# Patient Record
Sex: MALE | Race: WHITE | NOT HISPANIC OR LATINO | URBAN - METROPOLITAN AREA
[De-identification: names, ages, dates, MRNs, and addresses within clinical notes are randomized per-mention and may not be internally consistent; named-entity substitution may affect disease eponyms.]

---

## 2017-03-13 ENCOUNTER — AMBULATORY - HEALTHEAST (OUTPATIENT)
Dept: GERIATRICS | Facility: CLINIC | Age: 82
End: 2017-03-13

## 2017-03-13 ENCOUNTER — AMBULATORY - HEALTHEAST (OUTPATIENT)
Dept: ADMINISTRATIVE | Facility: CLINIC | Age: 82
End: 2017-03-13

## 2017-03-13 ASSESSMENT — MIFFLIN-ST. JEOR: SCORE: 2165.85

## 2017-03-14 ENCOUNTER — OFFICE VISIT - HEALTHEAST (OUTPATIENT)
Dept: GERIATRICS | Facility: CLINIC | Age: 82
End: 2017-03-14

## 2017-03-14 DIAGNOSIS — G89.29 CHRONIC BILATERAL LOW BACK PAIN WITHOUT SCIATICA: ICD-10-CM

## 2017-03-14 DIAGNOSIS — I82.409 RECURRENT DEEP VEIN THROMBOSIS (DVT) (H): ICD-10-CM

## 2017-03-14 DIAGNOSIS — I50.32 CHRONIC DIASTOLIC CONGESTIVE HEART FAILURE (H): ICD-10-CM

## 2017-03-14 DIAGNOSIS — J44.9 CHRONIC OBSTRUCTIVE PULMONARY DISEASE, UNSPECIFIED COPD TYPE (H): ICD-10-CM

## 2017-03-14 DIAGNOSIS — N18.30 CHRONIC KIDNEY DISEASE, STAGE III (MODERATE) (H): ICD-10-CM

## 2017-03-14 DIAGNOSIS — G47.33 OSA ON CPAP: ICD-10-CM

## 2017-03-14 DIAGNOSIS — E66.09 OBESITY DUE TO EXCESS CALORIES, UNSPECIFIED OBESITY SEVERITY: ICD-10-CM

## 2017-03-14 DIAGNOSIS — K51.919 ULCERATIVE COLITIS WITH COMPLICATION, UNSPECIFIED LOCATION (H): ICD-10-CM

## 2017-03-14 DIAGNOSIS — Z79.01 ANTICOAGULATED ON COUMADIN: ICD-10-CM

## 2017-03-14 DIAGNOSIS — Z95.0 CARDIAC PACEMAKER IN SITU: ICD-10-CM

## 2017-03-14 DIAGNOSIS — I25.10 CORONARY ARTERY DISEASE INVOLVING NATIVE CORONARY ARTERY OF NATIVE HEART WITHOUT ANGINA PECTORIS: ICD-10-CM

## 2017-03-14 DIAGNOSIS — I10 ESSENTIAL HYPERTENSION WITH GOAL BLOOD PRESSURE LESS THAN 140/90: ICD-10-CM

## 2017-03-14 DIAGNOSIS — I71.40 ABDOMINAL AORTIC ANEURYSM (AAA) WITHOUT RUPTURE (H): ICD-10-CM

## 2017-03-14 DIAGNOSIS — I48.20 CHRONIC ATRIAL FIBRILLATION (H): ICD-10-CM

## 2017-03-14 DIAGNOSIS — M54.50 CHRONIC BILATERAL LOW BACK PAIN WITHOUT SCIATICA: ICD-10-CM

## 2017-03-14 DIAGNOSIS — S32.049D CLOSED FRACTURE OF FOURTH LUMBAR VERTEBRA WITH ROUTINE HEALING, UNSPECIFIED FRACTURE MORPHOLOGY, SUBSEQUENT ENCOUNTER: ICD-10-CM

## 2017-03-14 DIAGNOSIS — L71.9 ROSACEA: ICD-10-CM

## 2017-03-16 ENCOUNTER — OFFICE VISIT - HEALTHEAST (OUTPATIENT)
Dept: GERIATRICS | Facility: CLINIC | Age: 82
End: 2017-03-16

## 2017-03-16 DIAGNOSIS — G47.33 OSA ON CPAP: ICD-10-CM

## 2017-03-16 DIAGNOSIS — I82.409 RECURRENT DEEP VEIN THROMBOSIS (DVT) (H): ICD-10-CM

## 2017-03-16 DIAGNOSIS — M54.50 CHRONIC BILATERAL LOW BACK PAIN WITHOUT SCIATICA: ICD-10-CM

## 2017-03-16 DIAGNOSIS — S32.049D CLOSED FRACTURE OF FOURTH LUMBAR VERTEBRA WITH ROUTINE HEALING, UNSPECIFIED FRACTURE MORPHOLOGY, SUBSEQUENT ENCOUNTER: ICD-10-CM

## 2017-03-16 DIAGNOSIS — K51.919 ULCERATIVE COLITIS WITH COMPLICATION, UNSPECIFIED LOCATION (H): ICD-10-CM

## 2017-03-16 DIAGNOSIS — G89.29 CHRONIC BILATERAL LOW BACK PAIN WITHOUT SCIATICA: ICD-10-CM

## 2017-03-16 DIAGNOSIS — I10 ESSENTIAL HYPERTENSION WITH GOAL BLOOD PRESSURE LESS THAN 140/90: ICD-10-CM

## 2017-03-16 DIAGNOSIS — Z79.01 ANTICOAGULATED ON COUMADIN: ICD-10-CM

## 2017-03-16 DIAGNOSIS — N18.30 CHRONIC KIDNEY DISEASE, STAGE III (MODERATE) (H): ICD-10-CM

## 2017-03-16 DIAGNOSIS — I50.32 CHRONIC DIASTOLIC CONGESTIVE HEART FAILURE (H): ICD-10-CM

## 2017-03-16 DIAGNOSIS — I25.10 CORONARY ARTERY DISEASE INVOLVING NATIVE CORONARY ARTERY OF NATIVE HEART WITHOUT ANGINA PECTORIS: ICD-10-CM

## 2017-03-16 DIAGNOSIS — Z95.0 CARDIAC PACEMAKER IN SITU: ICD-10-CM

## 2017-03-16 DIAGNOSIS — I48.20 CHRONIC ATRIAL FIBRILLATION (H): ICD-10-CM

## 2017-03-16 DIAGNOSIS — L71.9 ROSACEA: ICD-10-CM

## 2017-03-16 DIAGNOSIS — E66.09 OBESITY DUE TO EXCESS CALORIES, UNSPECIFIED OBESITY SEVERITY: ICD-10-CM

## 2017-03-16 DIAGNOSIS — I71.40 ABDOMINAL AORTIC ANEURYSM (AAA) WITHOUT RUPTURE (H): ICD-10-CM

## 2017-03-16 DIAGNOSIS — J44.9 CHRONIC OBSTRUCTIVE PULMONARY DISEASE, UNSPECIFIED COPD TYPE (H): ICD-10-CM

## 2017-03-16 ASSESSMENT — MIFFLIN-ST. JEOR: SCORE: 2165.85

## 2017-03-21 ENCOUNTER — OFFICE VISIT - HEALTHEAST (OUTPATIENT)
Dept: GERIATRICS | Facility: CLINIC | Age: 82
End: 2017-03-21

## 2017-03-21 DIAGNOSIS — N18.30 CHRONIC KIDNEY DISEASE, STAGE III (MODERATE) (H): ICD-10-CM

## 2017-03-21 DIAGNOSIS — Z95.0 CARDIAC PACEMAKER IN SITU: ICD-10-CM

## 2017-03-21 DIAGNOSIS — I10 ESSENTIAL HYPERTENSION WITH GOAL BLOOD PRESSURE LESS THAN 140/90: ICD-10-CM

## 2017-03-21 DIAGNOSIS — Z79.01 ANTICOAGULATED ON COUMADIN: ICD-10-CM

## 2017-03-21 DIAGNOSIS — J44.9 CHRONIC OBSTRUCTIVE PULMONARY DISEASE, UNSPECIFIED COPD TYPE (H): ICD-10-CM

## 2017-03-21 DIAGNOSIS — I48.20 CHRONIC ATRIAL FIBRILLATION (H): ICD-10-CM

## 2017-03-21 DIAGNOSIS — S32.049D CLOSED FRACTURE OF FOURTH LUMBAR VERTEBRA WITH ROUTINE HEALING, UNSPECIFIED FRACTURE MORPHOLOGY, SUBSEQUENT ENCOUNTER: ICD-10-CM

## 2017-03-23 ENCOUNTER — OFFICE VISIT - HEALTHEAST (OUTPATIENT)
Dept: GERIATRICS | Facility: CLINIC | Age: 82
End: 2017-03-23

## 2017-03-23 DIAGNOSIS — I48.20 CHRONIC ATRIAL FIBRILLATION (H): ICD-10-CM

## 2017-03-23 DIAGNOSIS — K51.919 ULCERATIVE COLITIS WITH COMPLICATION, UNSPECIFIED LOCATION (H): ICD-10-CM

## 2017-03-23 DIAGNOSIS — G47.33 OSA ON CPAP: ICD-10-CM

## 2017-03-23 DIAGNOSIS — I82.409 RECURRENT DEEP VEIN THROMBOSIS (DVT) (H): ICD-10-CM

## 2017-03-23 DIAGNOSIS — I10 ESSENTIAL HYPERTENSION WITH GOAL BLOOD PRESSURE LESS THAN 140/90: ICD-10-CM

## 2017-03-23 DIAGNOSIS — S32.049D CLOSED FRACTURE OF FOURTH LUMBAR VERTEBRA WITH ROUTINE HEALING, UNSPECIFIED FRACTURE MORPHOLOGY, SUBSEQUENT ENCOUNTER: ICD-10-CM

## 2017-03-23 DIAGNOSIS — J44.9 CHRONIC OBSTRUCTIVE PULMONARY DISEASE, UNSPECIFIED COPD TYPE (H): ICD-10-CM

## 2017-03-23 DIAGNOSIS — E66.09 OBESITY DUE TO EXCESS CALORIES, UNSPECIFIED OBESITY SEVERITY: ICD-10-CM

## 2017-03-23 DIAGNOSIS — N40.0 BENIGN PROSTATIC HYPERPLASIA, PRESENCE OF LOWER URINARY TRACT SYMPTOMS UNSPECIFIED, UNSPECIFIED MORPHOLOGY: ICD-10-CM

## 2017-03-23 DIAGNOSIS — N18.30 CHRONIC KIDNEY DISEASE, STAGE III (MODERATE) (H): ICD-10-CM

## 2017-03-23 DIAGNOSIS — G89.29 CHRONIC BILATERAL LOW BACK PAIN WITHOUT SCIATICA: ICD-10-CM

## 2017-03-23 DIAGNOSIS — L71.9 ROSACEA: ICD-10-CM

## 2017-03-23 DIAGNOSIS — Z95.0 CARDIAC PACEMAKER IN SITU: ICD-10-CM

## 2017-03-23 DIAGNOSIS — Z79.01 ANTICOAGULATED ON COUMADIN: ICD-10-CM

## 2017-03-23 DIAGNOSIS — M54.50 CHRONIC BILATERAL LOW BACK PAIN WITHOUT SCIATICA: ICD-10-CM

## 2017-03-23 DIAGNOSIS — I71.40 ABDOMINAL AORTIC ANEURYSM (AAA) WITHOUT RUPTURE (H): ICD-10-CM

## 2017-03-23 DIAGNOSIS — I50.32 CHRONIC DIASTOLIC CONGESTIVE HEART FAILURE (H): ICD-10-CM

## 2017-03-23 DIAGNOSIS — I25.10 CORONARY ARTERY DISEASE INVOLVING NATIVE CORONARY ARTERY OF NATIVE HEART WITHOUT ANGINA PECTORIS: ICD-10-CM

## 2017-03-28 ENCOUNTER — OFFICE VISIT - HEALTHEAST (OUTPATIENT)
Dept: GERIATRICS | Facility: CLINIC | Age: 82
End: 2017-03-28

## 2017-03-28 DIAGNOSIS — Z95.0 CARDIAC PACEMAKER IN SITU: ICD-10-CM

## 2017-03-28 DIAGNOSIS — S32.049D CLOSED FRACTURE OF FOURTH LUMBAR VERTEBRA WITH ROUTINE HEALING, UNSPECIFIED FRACTURE MORPHOLOGY, SUBSEQUENT ENCOUNTER: ICD-10-CM

## 2017-03-28 DIAGNOSIS — M54.50 CHRONIC BILATERAL LOW BACK PAIN WITHOUT SCIATICA: ICD-10-CM

## 2017-03-28 DIAGNOSIS — I48.20 CHRONIC ATRIAL FIBRILLATION (H): ICD-10-CM

## 2017-03-28 DIAGNOSIS — I82.409 RECURRENT DEEP VEIN THROMBOSIS (DVT) (H): ICD-10-CM

## 2017-03-28 DIAGNOSIS — J44.9 COPD (CHRONIC OBSTRUCTIVE PULMONARY DISEASE) (H): ICD-10-CM

## 2017-03-28 DIAGNOSIS — I71.40 ABDOMINAL AORTIC ANEURYSM (AAA) WITHOUT RUPTURE (H): ICD-10-CM

## 2017-03-28 DIAGNOSIS — N18.30 CHRONIC KIDNEY DISEASE, STAGE III (MODERATE) (H): ICD-10-CM

## 2017-03-28 DIAGNOSIS — I10 ESSENTIAL HYPERTENSION WITH GOAL BLOOD PRESSURE LESS THAN 140/90: ICD-10-CM

## 2017-03-28 DIAGNOSIS — G47.33 OSA ON CPAP: ICD-10-CM

## 2017-03-28 DIAGNOSIS — G89.29 CHRONIC BILATERAL LOW BACK PAIN WITHOUT SCIATICA: ICD-10-CM

## 2017-03-28 DIAGNOSIS — I50.32 CHRONIC DIASTOLIC CONGESTIVE HEART FAILURE (H): ICD-10-CM

## 2017-03-28 DIAGNOSIS — K51.919 ULCERATIVE COLITIS WITH COMPLICATION, UNSPECIFIED LOCATION (H): ICD-10-CM

## 2017-03-28 DIAGNOSIS — I25.10 CORONARY ARTERY DISEASE INVOLVING NATIVE CORONARY ARTERY OF NATIVE HEART WITHOUT ANGINA PECTORIS: ICD-10-CM

## 2017-03-28 DIAGNOSIS — E66.09 OBESITY DUE TO EXCESS CALORIES, UNSPECIFIED OBESITY SEVERITY: ICD-10-CM

## 2017-03-28 DIAGNOSIS — N40.0 BENIGN PROSTATIC HYPERPLASIA, PRESENCE OF LOWER URINARY TRACT SYMPTOMS UNSPECIFIED, UNSPECIFIED MORPHOLOGY: ICD-10-CM

## 2017-03-29 ENCOUNTER — AMBULATORY - HEALTHEAST (OUTPATIENT)
Dept: GERIATRICS | Facility: CLINIC | Age: 82
End: 2017-03-29

## 2017-03-30 ENCOUNTER — OFFICE VISIT - HEALTHEAST (OUTPATIENT)
Dept: GERIATRICS | Facility: CLINIC | Age: 82
End: 2017-03-30

## 2017-03-30 DIAGNOSIS — J44.9 CHRONIC OBSTRUCTIVE PULMONARY DISEASE, UNSPECIFIED COPD TYPE (H): ICD-10-CM

## 2017-03-30 DIAGNOSIS — I48.20 CHRONIC ATRIAL FIBRILLATION (H): ICD-10-CM

## 2017-03-30 DIAGNOSIS — I82.409 RECURRENT DEEP VEIN THROMBOSIS (DVT) (H): ICD-10-CM

## 2017-03-30 DIAGNOSIS — K51.919 ULCERATIVE COLITIS WITH COMPLICATION, UNSPECIFIED LOCATION (H): ICD-10-CM

## 2017-03-30 DIAGNOSIS — E66.09 OBESITY DUE TO EXCESS CALORIES, UNSPECIFIED OBESITY SEVERITY: ICD-10-CM

## 2017-03-30 DIAGNOSIS — M54.50 CHRONIC BILATERAL LOW BACK PAIN WITHOUT SCIATICA: ICD-10-CM

## 2017-03-30 DIAGNOSIS — Z95.0 CARDIAC PACEMAKER IN SITU: ICD-10-CM

## 2017-03-30 DIAGNOSIS — I25.10 CORONARY ARTERY DISEASE INVOLVING NATIVE CORONARY ARTERY OF NATIVE HEART WITHOUT ANGINA PECTORIS: ICD-10-CM

## 2017-03-30 DIAGNOSIS — I71.40 ABDOMINAL AORTIC ANEURYSM (AAA) WITHOUT RUPTURE (H): ICD-10-CM

## 2017-03-30 DIAGNOSIS — N40.0 BENIGN PROSTATIC HYPERPLASIA, PRESENCE OF LOWER URINARY TRACT SYMPTOMS UNSPECIFIED, UNSPECIFIED MORPHOLOGY: ICD-10-CM

## 2017-03-30 DIAGNOSIS — I50.32 CHRONIC DIASTOLIC CONGESTIVE HEART FAILURE (H): ICD-10-CM

## 2017-03-30 DIAGNOSIS — S32.049D CLOSED FRACTURE OF FOURTH LUMBAR VERTEBRA WITH ROUTINE HEALING, UNSPECIFIED FRACTURE MORPHOLOGY, SUBSEQUENT ENCOUNTER: ICD-10-CM

## 2017-03-30 DIAGNOSIS — I10 ESSENTIAL HYPERTENSION WITH GOAL BLOOD PRESSURE LESS THAN 140/90: ICD-10-CM

## 2017-03-30 DIAGNOSIS — N18.30 CHRONIC KIDNEY DISEASE, STAGE III (MODERATE) (H): ICD-10-CM

## 2017-03-30 DIAGNOSIS — G89.29 CHRONIC BILATERAL LOW BACK PAIN WITHOUT SCIATICA: ICD-10-CM

## 2017-03-30 DIAGNOSIS — G47.33 OSA ON CPAP: ICD-10-CM

## 2017-03-30 DIAGNOSIS — L71.9 ROSACEA: ICD-10-CM

## 2017-03-30 DIAGNOSIS — Z79.01 ANTICOAGULATED ON COUMADIN: ICD-10-CM

## 2017-04-04 ENCOUNTER — OFFICE VISIT - HEALTHEAST (OUTPATIENT)
Dept: GERIATRICS | Facility: CLINIC | Age: 82
End: 2017-04-04

## 2017-04-04 DIAGNOSIS — I48.20 CHRONIC ATRIAL FIBRILLATION (H): ICD-10-CM

## 2017-04-04 DIAGNOSIS — I71.40 ABDOMINAL AORTIC ANEURYSM (AAA) WITHOUT RUPTURE (H): ICD-10-CM

## 2017-04-04 DIAGNOSIS — Z79.01 ANTICOAGULATED ON COUMADIN: ICD-10-CM

## 2017-04-04 DIAGNOSIS — N18.30 CHRONIC KIDNEY DISEASE, STAGE III (MODERATE) (H): ICD-10-CM

## 2017-04-04 DIAGNOSIS — I82.409 RECURRENT DEEP VEIN THROMBOSIS (DVT) (H): ICD-10-CM

## 2017-04-04 DIAGNOSIS — L97.509 FOOT ULCER (H): ICD-10-CM

## 2017-04-04 DIAGNOSIS — Z95.0 CARDIAC PACEMAKER IN SITU: ICD-10-CM

## 2017-04-04 DIAGNOSIS — J44.9 CHRONIC OBSTRUCTIVE PULMONARY DISEASE, UNSPECIFIED COPD TYPE (H): ICD-10-CM

## 2017-04-04 DIAGNOSIS — I10 ESSENTIAL HYPERTENSION WITH GOAL BLOOD PRESSURE LESS THAN 140/90: ICD-10-CM

## 2017-04-04 DIAGNOSIS — S32.049D CLOSED FRACTURE OF FOURTH LUMBAR VERTEBRA WITH ROUTINE HEALING, UNSPECIFIED FRACTURE MORPHOLOGY, SUBSEQUENT ENCOUNTER: ICD-10-CM

## 2017-04-04 DIAGNOSIS — G47.33 OSA ON CPAP: ICD-10-CM

## 2017-04-06 ENCOUNTER — OFFICE VISIT - HEALTHEAST (OUTPATIENT)
Dept: GERIATRICS | Facility: CLINIC | Age: 82
End: 2017-04-06

## 2017-04-06 DIAGNOSIS — Z79.01 ANTICOAGULATED ON COUMADIN: ICD-10-CM

## 2017-04-06 DIAGNOSIS — G89.29 CHRONIC BILATERAL LOW BACK PAIN WITHOUT SCIATICA: ICD-10-CM

## 2017-04-06 DIAGNOSIS — I10 ESSENTIAL HYPERTENSION WITH GOAL BLOOD PRESSURE LESS THAN 140/90: ICD-10-CM

## 2017-04-06 DIAGNOSIS — N40.0 BENIGN PROSTATIC HYPERPLASIA, PRESENCE OF LOWER URINARY TRACT SYMPTOMS UNSPECIFIED, UNSPECIFIED MORPHOLOGY: ICD-10-CM

## 2017-04-06 DIAGNOSIS — L71.9 ROSACEA: ICD-10-CM

## 2017-04-06 DIAGNOSIS — Z95.0 CARDIAC PACEMAKER IN SITU: ICD-10-CM

## 2017-04-06 DIAGNOSIS — G47.33 OSA ON CPAP: ICD-10-CM

## 2017-04-06 DIAGNOSIS — I25.10 CORONARY ARTERY DISEASE INVOLVING NATIVE CORONARY ARTERY OF NATIVE HEART WITHOUT ANGINA PECTORIS: ICD-10-CM

## 2017-04-06 DIAGNOSIS — K51.919 ULCERATIVE COLITIS WITH COMPLICATION, UNSPECIFIED LOCATION (H): ICD-10-CM

## 2017-04-06 DIAGNOSIS — I71.40 ABDOMINAL AORTIC ANEURYSM (AAA) WITHOUT RUPTURE (H): ICD-10-CM

## 2017-04-06 DIAGNOSIS — E66.09 OBESITY DUE TO EXCESS CALORIES, UNSPECIFIED OBESITY SEVERITY: ICD-10-CM

## 2017-04-06 DIAGNOSIS — I50.32 CHRONIC DIASTOLIC CONGESTIVE HEART FAILURE (H): ICD-10-CM

## 2017-04-06 DIAGNOSIS — J44.9 CHRONIC OBSTRUCTIVE PULMONARY DISEASE, UNSPECIFIED COPD TYPE (H): ICD-10-CM

## 2017-04-06 DIAGNOSIS — S32.049D CLOSED FRACTURE OF FOURTH LUMBAR VERTEBRA WITH ROUTINE HEALING, UNSPECIFIED FRACTURE MORPHOLOGY, SUBSEQUENT ENCOUNTER: ICD-10-CM

## 2017-04-06 DIAGNOSIS — S90.812D: ICD-10-CM

## 2017-04-06 DIAGNOSIS — I48.20 CHRONIC ATRIAL FIBRILLATION (H): ICD-10-CM

## 2017-04-06 DIAGNOSIS — N18.30 CHRONIC KIDNEY DISEASE, STAGE III (MODERATE) (H): ICD-10-CM

## 2017-04-06 DIAGNOSIS — I82.409 RECURRENT DEEP VEIN THROMBOSIS (DVT) (H): ICD-10-CM

## 2017-04-06 DIAGNOSIS — M54.50 CHRONIC BILATERAL LOW BACK PAIN WITHOUT SCIATICA: ICD-10-CM

## 2017-04-11 ENCOUNTER — OFFICE VISIT - HEALTHEAST (OUTPATIENT)
Dept: GERIATRICS | Facility: CLINIC | Age: 82
End: 2017-04-11

## 2017-04-11 DIAGNOSIS — M54.50 CHRONIC BILATERAL LOW BACK PAIN WITHOUT SCIATICA: ICD-10-CM

## 2017-04-11 DIAGNOSIS — L97.509 FOOT ULCER (H): ICD-10-CM

## 2017-04-11 DIAGNOSIS — I50.32 CHRONIC DIASTOLIC CONGESTIVE HEART FAILURE (H): ICD-10-CM

## 2017-04-11 DIAGNOSIS — K51.919 ULCERATIVE COLITIS WITH COMPLICATION, UNSPECIFIED LOCATION (H): ICD-10-CM

## 2017-04-11 DIAGNOSIS — S32.049D CLOSED FRACTURE OF FOURTH LUMBAR VERTEBRA WITH ROUTINE HEALING, UNSPECIFIED FRACTURE MORPHOLOGY, SUBSEQUENT ENCOUNTER: ICD-10-CM

## 2017-04-11 DIAGNOSIS — Z95.0 CARDIAC PACEMAKER IN SITU: ICD-10-CM

## 2017-04-11 DIAGNOSIS — I71.40 ABDOMINAL AORTIC ANEURYSM (AAA) WITHOUT RUPTURE (H): ICD-10-CM

## 2017-04-11 DIAGNOSIS — N40.0 BENIGN PROSTATIC HYPERPLASIA, PRESENCE OF LOWER URINARY TRACT SYMPTOMS UNSPECIFIED, UNSPECIFIED MORPHOLOGY: ICD-10-CM

## 2017-04-11 DIAGNOSIS — I82.409 RECURRENT DEEP VEIN THROMBOSIS (DVT) (H): ICD-10-CM

## 2017-04-11 DIAGNOSIS — I25.10 CORONARY ARTERY DISEASE INVOLVING NATIVE CORONARY ARTERY OF NATIVE HEART WITHOUT ANGINA PECTORIS: ICD-10-CM

## 2017-04-11 DIAGNOSIS — G47.33 OSA ON CPAP: ICD-10-CM

## 2017-04-11 DIAGNOSIS — J44.9 COPD (CHRONIC OBSTRUCTIVE PULMONARY DISEASE) (H): ICD-10-CM

## 2017-04-11 DIAGNOSIS — I80.9 THROMBOPHLEBITIS: ICD-10-CM

## 2017-04-11 DIAGNOSIS — I10 ESSENTIAL HYPERTENSION WITH GOAL BLOOD PRESSURE LESS THAN 140/90: ICD-10-CM

## 2017-04-11 DIAGNOSIS — Z79.01 ANTICOAGULATED ON COUMADIN: ICD-10-CM

## 2017-04-11 DIAGNOSIS — E66.09 OBESITY DUE TO EXCESS CALORIES, UNSPECIFIED OBESITY SEVERITY: ICD-10-CM

## 2017-04-11 DIAGNOSIS — G89.29 CHRONIC BILATERAL LOW BACK PAIN WITHOUT SCIATICA: ICD-10-CM

## 2017-04-11 DIAGNOSIS — I48.20 CHRONIC ATRIAL FIBRILLATION (H): ICD-10-CM

## 2017-04-11 DIAGNOSIS — N18.30 CHRONIC KIDNEY DISEASE, STAGE III (MODERATE) (H): ICD-10-CM

## 2017-04-14 ENCOUNTER — OFFICE VISIT - HEALTHEAST (OUTPATIENT)
Dept: GERIATRICS | Facility: CLINIC | Age: 82
End: 2017-04-14

## 2017-04-14 DIAGNOSIS — G47.33 OSA ON CPAP: ICD-10-CM

## 2017-04-14 DIAGNOSIS — G89.29 CHRONIC BILATERAL LOW BACK PAIN WITHOUT SCIATICA: ICD-10-CM

## 2017-04-14 DIAGNOSIS — Z79.01 ANTICOAGULATED ON COUMADIN: ICD-10-CM

## 2017-04-14 DIAGNOSIS — M54.50 CHRONIC BILATERAL LOW BACK PAIN WITHOUT SCIATICA: ICD-10-CM

## 2017-04-14 DIAGNOSIS — I50.32 CHRONIC DIASTOLIC CONGESTIVE HEART FAILURE (H): ICD-10-CM

## 2017-04-14 DIAGNOSIS — N18.30 CHRONIC KIDNEY DISEASE, STAGE III (MODERATE) (H): ICD-10-CM

## 2017-04-14 DIAGNOSIS — I71.40 ABDOMINAL AORTIC ANEURYSM (AAA) WITHOUT RUPTURE (H): ICD-10-CM

## 2017-04-14 DIAGNOSIS — J44.9 CHRONIC OBSTRUCTIVE PULMONARY DISEASE, UNSPECIFIED COPD TYPE (H): ICD-10-CM

## 2017-04-14 DIAGNOSIS — K51.919 ULCERATIVE COLITIS WITH COMPLICATION, UNSPECIFIED LOCATION (H): ICD-10-CM

## 2017-04-14 DIAGNOSIS — I82.409 RECURRENT DEEP VEIN THROMBOSIS (DVT) (H): ICD-10-CM

## 2017-04-14 DIAGNOSIS — E66.09 OBESITY DUE TO EXCESS CALORIES, UNSPECIFIED OBESITY SEVERITY: ICD-10-CM

## 2017-04-14 DIAGNOSIS — Z95.0 CARDIAC PACEMAKER IN SITU: ICD-10-CM

## 2017-04-14 DIAGNOSIS — S32.049D CLOSED FRACTURE OF FOURTH LUMBAR VERTEBRA WITH ROUTINE HEALING, UNSPECIFIED FRACTURE MORPHOLOGY, SUBSEQUENT ENCOUNTER: ICD-10-CM

## 2017-04-14 DIAGNOSIS — N40.0 BENIGN PROSTATIC HYPERPLASIA, PRESENCE OF LOWER URINARY TRACT SYMPTOMS UNSPECIFIED, UNSPECIFIED MORPHOLOGY: ICD-10-CM

## 2017-04-14 DIAGNOSIS — L71.9 ROSACEA: ICD-10-CM

## 2017-04-14 DIAGNOSIS — I10 ESSENTIAL HYPERTENSION WITH GOAL BLOOD PRESSURE LESS THAN 140/90: ICD-10-CM

## 2017-04-14 DIAGNOSIS — I25.10 CORONARY ARTERY DISEASE INVOLVING NATIVE CORONARY ARTERY OF NATIVE HEART WITHOUT ANGINA PECTORIS: ICD-10-CM

## 2017-04-14 DIAGNOSIS — I48.20 CHRONIC ATRIAL FIBRILLATION (H): ICD-10-CM

## 2017-04-18 ENCOUNTER — OFFICE VISIT - HEALTHEAST (OUTPATIENT)
Dept: GERIATRICS | Facility: CLINIC | Age: 82
End: 2017-04-18

## 2017-04-18 DIAGNOSIS — N18.30 CHRONIC KIDNEY DISEASE, STAGE III (MODERATE) (H): ICD-10-CM

## 2017-04-18 DIAGNOSIS — S90.812D: ICD-10-CM

## 2017-04-18 DIAGNOSIS — Z79.01 ANTICOAGULATED ON COUMADIN: ICD-10-CM

## 2017-04-18 DIAGNOSIS — K51.919 ULCERATIVE COLITIS WITH COMPLICATION, UNSPECIFIED LOCATION (H): ICD-10-CM

## 2017-04-18 DIAGNOSIS — J44.9 CHRONIC OBSTRUCTIVE PULMONARY DISEASE, UNSPECIFIED COPD TYPE (H): ICD-10-CM

## 2017-04-18 DIAGNOSIS — E66.09 OBESITY DUE TO EXCESS CALORIES, UNSPECIFIED OBESITY SEVERITY: ICD-10-CM

## 2017-04-18 DIAGNOSIS — L97.509 FOOT ULCER (H): ICD-10-CM

## 2017-04-18 DIAGNOSIS — I50.32 CHRONIC DIASTOLIC CONGESTIVE HEART FAILURE (H): ICD-10-CM

## 2017-04-18 DIAGNOSIS — I82.409 RECURRENT DEEP VEIN THROMBOSIS (DVT) (H): ICD-10-CM

## 2017-04-18 DIAGNOSIS — I48.20 CHRONIC ATRIAL FIBRILLATION (H): ICD-10-CM

## 2017-04-18 DIAGNOSIS — R19.7 DIARRHEA: ICD-10-CM

## 2017-04-18 DIAGNOSIS — S32.049D CLOSED FRACTURE OF FOURTH LUMBAR VERTEBRA WITH ROUTINE HEALING, UNSPECIFIED FRACTURE MORPHOLOGY, SUBSEQUENT ENCOUNTER: ICD-10-CM

## 2017-04-18 DIAGNOSIS — Z95.0 CARDIAC PACEMAKER IN SITU: ICD-10-CM

## 2017-04-20 ENCOUNTER — OFFICE VISIT - HEALTHEAST (OUTPATIENT)
Dept: GERIATRICS | Facility: CLINIC | Age: 82
End: 2017-04-20

## 2017-04-20 DIAGNOSIS — N40.0 BENIGN PROSTATIC HYPERPLASIA, PRESENCE OF LOWER URINARY TRACT SYMPTOMS UNSPECIFIED, UNSPECIFIED MORPHOLOGY: ICD-10-CM

## 2017-04-20 DIAGNOSIS — E66.09 OBESITY DUE TO EXCESS CALORIES, UNSPECIFIED OBESITY SEVERITY: ICD-10-CM

## 2017-04-20 DIAGNOSIS — I48.20 CHRONIC ATRIAL FIBRILLATION (H): ICD-10-CM

## 2017-04-20 DIAGNOSIS — I50.32 CHRONIC DIASTOLIC CONGESTIVE HEART FAILURE (H): ICD-10-CM

## 2017-04-20 DIAGNOSIS — I10 ESSENTIAL HYPERTENSION WITH GOAL BLOOD PRESSURE LESS THAN 140/90: ICD-10-CM

## 2017-04-20 DIAGNOSIS — Z79.01 ANTICOAGULATED ON COUMADIN: ICD-10-CM

## 2017-04-20 DIAGNOSIS — K51.919 ULCERATIVE COLITIS WITH COMPLICATION, UNSPECIFIED LOCATION (H): ICD-10-CM

## 2017-04-20 DIAGNOSIS — Z95.0 CARDIAC PACEMAKER IN SITU: ICD-10-CM

## 2017-04-20 DIAGNOSIS — I71.40 ABDOMINAL AORTIC ANEURYSM (AAA) WITHOUT RUPTURE (H): ICD-10-CM

## 2017-04-20 DIAGNOSIS — I82.409 RECURRENT DEEP VEIN THROMBOSIS (DVT) (H): ICD-10-CM

## 2017-04-20 DIAGNOSIS — S90.812D: ICD-10-CM

## 2017-04-20 DIAGNOSIS — S32.049D CLOSED FRACTURE OF FOURTH LUMBAR VERTEBRA WITH ROUTINE HEALING, UNSPECIFIED FRACTURE MORPHOLOGY, SUBSEQUENT ENCOUNTER: ICD-10-CM

## 2017-04-20 DIAGNOSIS — I25.10 CORONARY ARTERY DISEASE INVOLVING NATIVE CORONARY ARTERY OF NATIVE HEART WITHOUT ANGINA PECTORIS: ICD-10-CM

## 2017-04-20 DIAGNOSIS — N18.30 CHRONIC KIDNEY DISEASE, STAGE III (MODERATE) (H): ICD-10-CM

## 2017-04-20 DIAGNOSIS — G89.29 CHRONIC BILATERAL LOW BACK PAIN WITHOUT SCIATICA: ICD-10-CM

## 2017-04-20 DIAGNOSIS — M54.50 CHRONIC BILATERAL LOW BACK PAIN WITHOUT SCIATICA: ICD-10-CM

## 2017-04-20 DIAGNOSIS — G47.33 OSA ON CPAP: ICD-10-CM

## 2017-04-21 ENCOUNTER — AMBULATORY - HEALTHEAST (OUTPATIENT)
Dept: GERIATRICS | Facility: CLINIC | Age: 82
End: 2017-04-21

## 2018-04-23 ENCOUNTER — RECORDS - HEALTHEAST (OUTPATIENT)
Dept: LAB | Facility: CLINIC | Age: 83
End: 2018-04-23

## 2018-04-24 LAB
ANION GAP SERPL CALCULATED.3IONS-SCNC: 11 MMOL/L (ref 5–18)
BUN SERPL-MCNC: 26 MG/DL (ref 8–28)
CALCIUM SERPL-MCNC: 9.2 MG/DL (ref 8.5–10.5)
CHLORIDE BLD-SCNC: 95 MMOL/L (ref 98–107)
CO2 SERPL-SCNC: 41 MMOL/L (ref 22–31)
CREAT SERPL-MCNC: 1.22 MG/DL (ref 0.7–1.3)
ERYTHROCYTE [DISTWIDTH] IN BLOOD BY AUTOMATED COUNT: 14.9 % (ref 11–14.5)
GFR SERPL CREATININE-BSD FRML MDRD: 56 ML/MIN/1.73M2
GLUCOSE BLD-MCNC: 121 MG/DL (ref 70–125)
HCT VFR BLD AUTO: 49.4 % (ref 40–54)
HGB BLD-MCNC: 14.5 G/DL (ref 14–18)
MCH RBC QN AUTO: 33.3 PG (ref 27–34)
MCHC RBC AUTO-ENTMCNC: 29.4 G/DL (ref 32–36)
MCV RBC AUTO: 114 FL (ref 80–100)
PLATELET # BLD AUTO: 187 THOU/UL (ref 140–440)
PMV BLD AUTO: 11.1 FL (ref 8.5–12.5)
POTASSIUM BLD-SCNC: 4 MMOL/L (ref 3.5–5)
RBC # BLD AUTO: 4.35 MILL/UL (ref 4.4–6.2)
SODIUM SERPL-SCNC: 147 MMOL/L (ref 136–145)
WBC: 5.8 THOU/UL (ref 4–11)

## 2021-05-30 VITALS — WEIGHT: 301.1 LBS | BODY MASS INDEX: 39.73 KG/M2

## 2021-05-30 VITALS — BODY MASS INDEX: 39.73 KG/M2 | WEIGHT: 301.1 LBS

## 2021-05-30 VITALS — BODY MASS INDEX: 41.68 KG/M2 | WEIGHT: 315 LBS

## 2021-05-30 VITALS — WEIGHT: 309.5 LBS | BODY MASS INDEX: 40.83 KG/M2

## 2021-05-30 VITALS — BODY MASS INDEX: 40.83 KG/M2 | WEIGHT: 309.5 LBS

## 2021-05-30 VITALS — WEIGHT: 301 LBS | BODY MASS INDEX: 39.71 KG/M2

## 2021-05-30 VITALS — WEIGHT: 315 LBS | BODY MASS INDEX: 41.68 KG/M2

## 2021-05-30 VITALS — BODY MASS INDEX: 41.75 KG/M2 | WEIGHT: 315 LBS | HEIGHT: 73 IN

## 2021-05-30 VITALS — BODY MASS INDEX: 41.94 KG/M2 | WEIGHT: 315 LBS

## 2021-05-30 VITALS — WEIGHT: 315 LBS | BODY MASS INDEX: 42.09 KG/M2

## 2021-06-09 NOTE — PROGRESS NOTES
[facility]    DATE: 2017    NAME: David A Engelmann  : 10/7/1931           MR# [id number]     CODE STATUS:  FULL CODE      VISIT TYPE: Problem   FACILITY: [facility] [[id number]]    ROOM: 405    PRIMARY CARE PROVIDER: No Primary Care Provider Phone: None Fax:[phone]    History of Present Illness:   David A Engelmann is a 85 y.o. male with A recent L4 vertebral fracture. He feels he's making slow progress but therapy is concerned about his need for significant assistance to get them up and get them moving. He is using his pain medicines judiciously but has developed abrasions on Both feet  Related to exercise in the thin socks at the rehab center. His abrasions are clean showing some signs of healing but still require padding and dressing to prevent continuation. The right foot is dry unfortunately the left foot is still somewhat moist and absorbent dressing will be necessary whereas padding on the right foot was be adequate. He now has shoes to protect his feet while he moves in therapy and admits that the decreased sensation makes it difficult to tell when he's injuring himself. He feels his large size is overwhelming the equipment.    Past Medical History:  Past Medical History:   Diagnosis Date     Abdominal aortic aneurysm     5.0cm     TEZ (acute kidney injury)      Anticoagulated on Coumadin      Atrial fibrillation     with pacemaker     BPH (benign prostatic hyperplasia)      CAD (coronary artery disease)     s/p CABG      Cardiac pacemaker in situ      Cervical spondylosis      Chronic back pain      Chronic kidney disease     stage III     Compression fracture of L1 lumbar vertebra      COPD (chronic obstructive pulmonary disease)      Diastolic congestive heart failure      Fracture of L1 vertebra      Fracture of T5 vertebra      Gout      Hyperlipidemia      Hypertension, essential      L4 vertebral fracture     Possibbly post spinal  manipulation     Obesity      BRITTNI on CPAP      Osteoporosis      Recurrent deep vein thrombosis (DVT)     with chronic anticoagulation     Rosacea      Ulcerative colitis        Allergies:  Allergies   Allergen Reactions     Penicillins        Current Medications:  Current Outpatient Prescriptions   Medication Sig     allopurinol (ZYLOPRIM) 100 MG tablet Take 100 mg by mouth daily.     bisacodyl (DULCOLAX) 10 mg suppository Insert 10 mg into the rectum daily as needed.     calcium carbonate-vitamin D3 (CALTRATE 600 PLUS D3) 600 mg(1,500mg) -400 unit per tablet Take 1 tablet by mouth 2 (two) times a day.      dutasteride (AVODART) 0.5 mg capsule Take 0.5 mg by mouth daily.     fluocinonide (LIDEX) 0.05 % external solution Apply 1 application topically 2 (two) times a day. Apply to face and ears     FLUTICASONE/VILANTEROL (BREO ELLIPTA INHL) Inhale 1 puff daily.     HYDROmorphone (DILAUDID) 4 MG tablet Take 4 mg by mouth every 4 (four) hours as needed for pain. 30 min Before therapy     ipratropium-albuterol (DUO-NEB) 0.5-2.5 mg/3 mL nebulizer Inhale 3 mL 4 (four) times a day.      metoprolol succinate (TOPROL-XL) 25 MG Take 25 mg by mouth daily.     omeprazole (PRILOSEC) 20 MG capsule Take 20 mg by mouth Daily before breakfast.     oxyCODONE (OXYCONTIN) 10 mg 12 hr tablet Take 10 mg by mouth once daily.      polyethylene glycol (MIRALAX) 17 gram packet Take 17 g by mouth daily.      potassium chloride (KLOR-CON) 20 mEq packet Take 20 mEq by mouth daily.     senna-docusate (PERICOLACE) 8.6-50 mg tablet Take 3 tablets by mouth 2 (two) times a day.     sulfaSALAzine (AZULFIDINE) 500 mg tablet Take 500 mg by mouth daily.     tamsulosin (FLOMAX) 0.4 mg Cp24 Take 0.8 mg by mouth.     torsemide (DEMADEX) 20 MG tablet Take 20 mg by mouth 3 (three) times a day. 2 tabs in morning, 2 tabs in afternoon, 1 tab at night     WARFARIN SODIUM (WARFARIN ORAL) Take 4 mg by mouth daily.        Review of Systems:  History obtained from  chart review and the patient  Respiratory ROS: no cough, shortness of breath, or wheezing  Cardiovascular ROS: no chest pain or dyspnea on exertion  Gastrointestinal ROS: no abdominal pain, change in bowel habits, or black or bloody stools  Genito-Urinary ROS: no dysuria, trouble voiding, or hematuria  Musculoskeletal ROS: It's especially lower extremity weakness but no real pain  Neurological ROS: no TIA or stroke symptoms  Dermatological ROS: moist ulcer on the plantar surface of the left foot related to abrasion and dried crust on the heel of the right foot         Laboratory:  Recent Labs      04/04/17   0618   INR  1.77*     Will modify his Coumadin intake to aim for the 2 to 3 range. Will try 4 mg a day With INR Tuesday.    Physical Examination:  /77  Pulse 63  Temp 97  F (36.1  C)  Resp 18  Wt (!) 315 lb 14.4 oz (143.3 kg)  SpO2 96%  BMI 41.68 kg/m2  General appearance: alert, appears stated age, cooperative, fatigued, mild distress, moderately obese and slowed mentation  Neck: no adenopathy, no carotid bruit, no JVD, supple, symmetrical, trachea midline and thyroid not enlarged, symmetric, no tenderness/mass/nodules  Lungs: clear to auscultation bilaterally  Heart: regular rate and rhythm, S1, S2 normal, no murmur, click, rub or gallop  Abdomen: soft, non-tender; bowel sounds normal; no masses,  no organomegaly  Extremities: ulcers on the bottom of the left foot and on the heel of the right foot neither looking infected but the left moist  Skin: skin lesions of the above and additionally significant stasis changes of both lower extremities as well as multiple tattoos  Neurologic: Mental status: Alert, oriented, thought content appropriate  Motor:ssignificant decrease in lower extremity strength although he reports enthusiastically that he's gaining functional ability       Impression:  Stewart Smithmerytreva is a 85 y.o. male with Fourth lumbar vertebrae fracture and very slow recovery of lower  extremity function    1. Closed fracture of fourth lumbar vertebra with routine healing, unspecified fracture morphology, subsequent encounter     2. Chronic atrial fibrillation     3. Recurrent deep vein thrombosis (DVT)     4. Anticoagulated on Coumadin     5. Abdominal aortic aneurysm (AAA) without rupture     6. Essential hypertension with goal blood pressure less than 140/90     7. Cardiac pacemaker in situ     8. Obesity due to excess calories, unspecified obesity severity     9. Chronic bilateral low back pain without sciatica     10. Rosacea     11. Ulcerative colitis with complication, unspecified location     12. Chronic kidney disease, stage III (moderate)     13. Chronic obstructive pulmonary disease, unspecified COPD type     14. Chronic diastolic congestive heart failure     15. Coronary artery disease involving native coronary artery of native heart without angina pectoris     16. BRITTNI on CPAP     17. Benign prostatic hyperplasia, presence of lower urinary tract symptoms unspecified, unspecified morphology     18. Abrasion of foot or toe, left, subsequent encounter         Plan: Will continue aggressive rehabilitation in hopes of allowing him to return to his former functional level. I am concerned about his diminished function at this point in the insensitivity of his feet.    Electronically signed by: Romain Ramírez Sr., MD

## 2021-06-09 NOTE — PROGRESS NOTES
[facility]    DATE: 3/16/2017    NAME: David A Engelmann  : 10/7/1931           MR# [id number]     CODE STATUS:  FULL CODE      VISIT TYPE: Problem   FACILITY: [facility] [[id number]]    ROOM: 405    PRIMARY CARE PROVIDER: No Primary Care Provider Phone: None Fax:[phone]    History of Present Illness:   David A Engelmann is a 85 y.o. male with L4 vertebral fracture acute. He was stabilized at [facility] as recommendations for a brace and increasing activity which we are following. He is adequately anticoagulated at this time. He is using chronic pain medicines with his report of only intermittent use of five or 10 mg of OxyContin and new use of Dilaudid.    Past Medical History:  Past Medical History:   Diagnosis Date     Abdominal aortic aneurysm     5.0cm     TEZ (acute kidney injury)      Anticoagulated on Coumadin      Atrial fibrillation     with pacemaker     BPH (benign prostatic hyperplasia)      CAD (coronary artery disease)     s/p CABG      Cardiac pacemaker in situ      Cervical spondylosis      Chronic back pain      Chronic kidney disease     stage III     Compression fracture of L1 lumbar vertebra      COPD (chronic obstructive pulmonary disease)      Diastolic congestive heart failure      Fracture of L1 vertebra      Fracture of T5 vertebra      Gout      Hyperlipidemia      Hypertension, essential      L4 vertebral fracture     Possibbly post spinal manipulation     Obesity      BRITTNI on CPAP      Osteoporosis      Recurrent deep vein thrombosis (DVT)     with chronic anticoagulation     Rosacea      Ulcerative colitis        Allergies:  Allergies   Allergen Reactions     Penicillins        Current Medications:  Current Outpatient Prescriptions   Medication Sig     allopurinol (ZYLOPRIM) 100 MG tablet Take 100 mg by mouth daily.     calcium carbonate-vitamin D3 (CALTRATE 600 PLUS D3) 600 mg(1,500mg) -400 unit per tablet Take 1 tablet by  mouth 2 (two) times a day.      dutasteride (AVODART) 0.5 mg capsule Take 0.5 mg by mouth daily.     fluocinonide (LIDEX) 0.05 % external solution Apply 1 application topically 2 (two) times a day. Apply to face and ears     FLUTICASONE/VILANTEROL (BREO ELLIPTA INHL) Inhale 1 puff daily.     HYDROmorphone (DILAUDID) 4 MG tablet Take 4 mg by mouth every 4 (four) hours as needed for pain.     ipratropium-albuterol (DUO-NEB) 0.5-2.5 mg/3 mL nebulizer Inhale 3 mL 4 (four) times a day.      metoprolol succinate (TOPROL-XL) 25 MG Take 25 mg by mouth daily.     omeprazole (PRILOSEC) 20 MG capsule Take 20 mg by mouth Daily before breakfast.     oxyCODONE (OXYCONTIN) 10 mg 12 hr tablet Take 10 mg by mouth every 12 (twelve) hours.     polyethylene glycol (MIRALAX) 17 gram packet Take 17 g by mouth daily as needed.      potassium chloride (KLOR-CON) 20 mEq packet Take 20 mEq by mouth daily.     senna-docusate (PERICOLACE) 8.6-50 mg tablet Take 3 tablets by mouth 2 (two) times a day.     sulfaSALAzine (AZULFIDINE) 500 mg tablet Take 500 mg by mouth daily.     tamsulosin (FLOMAX) 0.4 mg Cp24 Take 0.8 mg by mouth.     torsemide (DEMADEX) 20 MG tablet Take 20 mg by mouth 3 (three) times a day. 2 tabs in morning, 2 tabs in afternoon, 1 tab at night     WARFARIN SODIUM (WARFARIN ORAL) Take by mouth. 3/13/17 INR 2.12  Take 4mg daily.  Next INR 3/16/17.       Review of Systems:  History obtained from chart review and the patient  Respiratory ROS: no cough, shortness of breath, or wheezing  Cardiovascular ROS: no chest pain or dyspnea on exertion  Gastrointestinal ROS: no abdominal pain, change in bowel habits, or black or bloody stools  Genito-Urinary ROS: no dysuria, trouble voiding, or hematuria  Musculoskeletal ROS: Back is sore one of the brace and even in the brace it sore he feels he's minimizing pain medicine use  Neurological ROS: no TIA or stroke symptoms  Dermatological ROS: multiple tattoos but no acute lesions, is a former  " in the [place]         Laboratory:  Recent Labs      03/16/17   0617   INR  2.50*     Adequate anticoagulation continue 4 mg Coumadin q. day    Physical Examination:  Visit Vitals     /80     Pulse 60     Temp 97.2  F (36.2  C)     Resp 18     Ht 6' 1\" (1.854 m)     Wt (!) 319 lb (144.7 kg)     SpO2 95%     BMI 42.09 kg/m2     General appearance: alert, appears stated age, cooperative, distracted, fatigued, moderate distress, morbidly obese and slowed mentation  Neck: no adenopathy, no carotid bruit, no JVD, supple, symmetrical, trachea midline and thyroid not enlarged, symmetric, no tenderness/mass/nodules  Lungs: clear to auscultation bilaterally  Heart: regular rate and rhythm, S1, S2 normal, no murmur, click, rub or gallop  Abdomen: soft, non-tender; bowel sounds normal; no masses,  no organomegaly  Extremities: extremities normal, atraumatic, no cyanosis or edema  Pulses: 2+ and symmetric  Skin: multiple arm and hand tattoos  Neurologic: Grossly normal       Impression:  David A Engelmann is a 85 y.o. male with Acute L4 fracture    1. Closed fracture of fourth lumbar vertebra with routine healing, unspecified fracture morphology, subsequent encounter     2. Chronic atrial fibrillation     3. Recurrent deep vein thrombosis (DVT)     4. Anticoagulated on Coumadin     5. Abdominal aortic aneurysm (AAA) without rupture     6. Essential hypertension with goal blood pressure less than 140/90     7. Cardiac pacemaker in situ     8. Obesity due to excess calories, unspecified obesity severity     9. Chronic bilateral low back pain without sciatica     10. Rosacea     11. Ulcerative colitis with complication, unspecified location     12. Chronic kidney disease, stage III (moderate)     13. Chronic obstructive pulmonary disease, unspecified COPD type     14. Chronic diastolic congestive heart failure     15. Coronary artery disease involving native coronary artery of native heart without angina pectoris   "   16. BRITTNI on CPAP         Plan: Will follow recommendations for mail and try to achieve full activity again. It's interesting one of the notes from mail suggested possibly ankylosing spondylitis but I don't believe that's an operative diagnosis. I do suspect that he will continue chronic pain medication even at discharge    Electronically signed by: Romain Ramírez Sr., MD

## 2021-06-09 NOTE — PROGRESS NOTES
[facility]    DATE: 3/30/2017    NAME: David A Engelmann  : 10/7/1931           MR# [id number]     CODE STATUS:  FULL CODE      VISIT TYPE: Problem   FACILITY: [facility] [[id number]]    ROOM: 405    PRIMARY CARE PROVIDER: No Primary Care Provider Phone: None Fax:[phone]    History of Present Illness:   David A Engelmann is a 85 y.o. male with L4 vertebral fracture and slow progress. He's had chronic back pain that has limited him in the past and this L4 fracture seems to increase the limitation. Mostly he reports any rotational motion on the spine is what causes problems as it has before. He's been using chiropractic since age 20 and initial evaluation posited that the vigorous chiropractic manipulation is what fracture the vertebrae. He reports he still somewhat constipated and is using the Dilaudid just once a day as well as the OxyContin once a day. He feels that he will do much better at home since he doesn't have to twist in his home to get on or off the toilet or stand up or sit down because of his equipment. He is eager for discharge    Past Medical History:  Past Medical History:   Diagnosis Date     Abdominal aortic aneurysm     5.0cm     TEZ (acute kidney injury)      Anticoagulated on Coumadin      Atrial fibrillation     with pacemaker     BPH (benign prostatic hyperplasia)      CAD (coronary artery disease)     s/p CABG      Cardiac pacemaker in situ      Cervical spondylosis      Chronic back pain      Chronic kidney disease     stage III     Compression fracture of L1 lumbar vertebra      COPD (chronic obstructive pulmonary disease)      Diastolic congestive heart failure      Fracture of L1 vertebra      Fracture of T5 vertebra      Gout      Hyperlipidemia      Hypertension, essential      L4 vertebral fracture     Possibbly post spinal manipulation     Obesity      BRITTNI on CPAP      Osteoporosis      Recurrent deep vein thrombosis (DVT)      with chronic anticoagulation     Rosacea      Ulcerative colitis        Allergies:  Allergies   Allergen Reactions     Penicillins        Current Medications:  Current Outpatient Prescriptions   Medication Sig     allopurinol (ZYLOPRIM) 100 MG tablet Take 100 mg by mouth daily.     bisacodyl (DULCOLAX) 10 mg suppository Insert 10 mg into the rectum daily as needed.     calcium carbonate-vitamin D3 (CALTRATE 600 PLUS D3) 600 mg(1,500mg) -400 unit per tablet Take 1 tablet by mouth 2 (two) times a day.      dutasteride (AVODART) 0.5 mg capsule Take 0.5 mg by mouth daily.     fluocinonide (LIDEX) 0.05 % external solution Apply 1 application topically 2 (two) times a day. Apply to face and ears     FLUTICASONE/VILANTEROL (BREO ELLIPTA INHL) Inhale 1 puff daily.     HYDROmorphone (DILAUDID) 4 MG tablet Take 4 mg by mouth daily. 30 min Before therapy     ipratropium-albuterol (DUO-NEB) 0.5-2.5 mg/3 mL nebulizer Inhale 3 mL 4 (four) times a day.      metoprolol succinate (TOPROL-XL) 25 MG Take 25 mg by mouth daily.     omeprazole (PRILOSEC) 20 MG capsule Take 20 mg by mouth Daily before breakfast.     oxyCODONE (OXYCONTIN) 10 mg 12 hr tablet Take 10 mg by mouth daily.      polyethylene glycol (MIRALAX) 17 gram packet Take 17 g by mouth daily.      potassium chloride (KLOR-CON) 20 mEq packet Take 20 mEq by mouth daily.     senna-docusate (PERICOLACE) 8.6-50 mg tablet Take 3 tablets by mouth 2 (two) times a day.     sulfaSALAzine (AZULFIDINE) 500 mg tablet Take 500 mg by mouth daily.     tamsulosin (FLOMAX) 0.4 mg Cp24 Take 0.8 mg by mouth.     torsemide (DEMADEX) 20 MG tablet Take 20 mg by mouth 3 (three) times a day. 2 tabs in morning, 2 tabs in afternoon, 1 tab at night     WARFARIN SODIUM (WARFARIN ORAL) Take 4 mg by mouth. 3/28/17 INR 1.84 Increase to 5mg T & F, 4mg all other days. Next INR 4/4.  3/20/17- INR 2.27-continue 4mg coumadin lazaro and recheck INR on 3/28/17  3/13/17 INR 2.12  Take 4mg daily.  Next INR  3/16/17.       Review of Systems:  History obtained from chart review and the patient  Respiratory ROS: no cough, shortness of breath, or wheezing  Cardiovascular ROS: no chest pain or dyspnea on exertion  Gastrointestinal ROS: no abdominal pain, change in bowel habits, or black or bloody stools  Genito-Urinary ROS: Denies symptoms  Musculoskeletal ROS: Reports twisting motion causes spasms of pain but mostly at rest he doesn't have pain  Neurological ROS: no TIA or stroke symptoms  Dermatological ROS: no open skin lesions reported         Laboratory:  Recent Labs      03/28/17   0635   INR  1.84*     Will increase Coumadin slightly to get them in the 2 to 3 range with recheck on Thursday    Physical Examination:  /78  Pulse 63  Temp 97.6  F (36.4  C)  Resp 18  Wt (!) 301 lb 1.6 oz (136.6 kg)  SpO2 96%  BMI 39.73 kg/m2  General appearance: alert, appears stated age, cooperative, distracted, flushed, no distress and slowed mentation  Neck: no adenopathy, no carotid bruit, no JVD, supple, symmetrical, trachea midline and thyroid not enlarged, symmetric, no tenderness/mass/nodules  Lungs: clear to auscultation bilaterally  Heart: regular rate and rhythm, S1, S2 normal, no murmur, click, rub or gallop  Abdomen: soft, non-tender; bowel sounds normal; no masses,  no organomegaly  Extremities: extremities normal, atraumatic, no cyanosis or edema  Pulses: 2+ and symmetric  Skin: Skin color, texture, turgor normal. No rashes or lesions  Neurologic: Grossly normal       Impression:  Stewart LOCKWOOD Jeanmarietreva is a 85 y.o. male with Closed fracture of the fourth lumbar vertebrae in relatively slow progress given comorbidities of chronic back pain    1. Closed fracture of fourth lumbar vertebra with routine healing, unspecified fracture morphology, subsequent encounter     2. Chronic atrial fibrillation     3. Recurrent deep vein thrombosis (DVT)     4. Anticoagulated on Coumadin     5. Abdominal aortic aneurysm (AAA)  without rupture     6. Essential hypertension with goal blood pressure less than 140/90     7. Cardiac pacemaker in situ     8. Obesity due to excess calories, unspecified obesity severity     9. Chronic bilateral low back pain without sciatica     10. Rosacea     11. Ulcerative colitis with complication, unspecified location     12. Chronic kidney disease, stage III (moderate)     13. Chronic obstructive pulmonary disease, unspecified COPD type     14. Chronic diastolic congestive heart failure     15. Coronary artery disease involving native coronary artery of native heart without angina pectoris     16. BRITTNI on CPAP     17. Benign prostatic hyperplasia, presence of lower urinary tract symptoms unspecified, unspecified morphology         Plan: Continue active rehab in hopes of maximizing his potential recovery as we get bony healing    Electronically signed by: Romain Ramírez Sr., MD

## 2021-06-09 NOTE — PROGRESS NOTES
[facility]    Facility:   [facility] [[id number]]   Code Status: FULL CODE      CHIEF COMPLAINT/REASON FOR VISIT:  Chief Complaint   Patient presents with     Problem Visit     L4 vetebral fracture       HISTORY:      HPI: Stewart is a 85 y.o. male undergoing physical and occupational therapy at [facility].  He is here with a L4 fracture which was stabilized at the [facility]. He is wearing a coset brace  His pain is controlled on OxyContin q 12 hours and he does ask me to please discontinue the evening dose. He reports no BM x2 days.He is an easy stand transfer in the facility. He denies CP but reports SOB with activity. Discharge is soft on 3/29/17.    Past Medical History:   Diagnosis Date     Abdominal aortic aneurysm     5.0cm     TEZ (acute kidney injury)      Anticoagulated on Coumadin      Atrial fibrillation     with pacemaker     BPH (benign prostatic hyperplasia)      CAD (coronary artery disease)     s/p CABG      Cardiac pacemaker in situ      Cervical spondylosis      Chronic back pain      Chronic kidney disease     stage III     Compression fracture of L1 lumbar vertebra      COPD (chronic obstructive pulmonary disease)      Diastolic congestive heart failure      Fracture of L1 vertebra      Fracture of T5 vertebra      Gout      Hyperlipidemia      Hypertension, essential      L4 vertebral fracture     Possibbly post spinal manipulation     Obesity      BRITTNI on CPAP      Osteoporosis      Recurrent deep vein thrombosis (DVT)     with chronic anticoagulation     Rosacea      Ulcerative colitis              Family History   Problem Relation Age of Onset     No Medical Problems Mother       98 yo     Stroke Father      Hemorrhagic     Dementia Sister      Alcoholism Brother      Hodgkin's lymphoma Daughter      Social History     Social History     Marital status:      Spouse name: N/A     Number of children: N/A     Years of education: N/A  "    Occupational History     Retired  stock room      Social History Main Topics     Smoking status: Not on file     Smokeless tobacco: Not on file     Alcohol use Not on file     Drug use: Not on file     Sexual activity: Not on file     Other Topics Concern     Not on file     Social History Narrative     lives with Wife ( she suffers from macular degeneration)  They have a single floor condo since 2000     3/2017         Review of Systems   Constitutional: Positive for fatigue. Negative for appetite change, chills and fever.   HENT: Negative for congestion and sore throat.    Eyes: Negative for visual disturbance.   Respiratory: Positive for shortness of breath. Negative for cough and wheezing.         SOB with activity     Cardiovascular: Negative for chest pain and leg swelling.        Pacemaker   Gastrointestinal: Positive for constipation. Negative for abdominal distention, abdominal pain, diarrhea and nausea.   Genitourinary: Negative for dysuria.   Musculoskeletal: Positive for back pain. Negative for arthralgias and myalgias.   Skin: Negative for color change, rash and wound.   Neurological: Negative for dizziness, weakness and numbness.   Psychiatric/Behavioral: Negative for agitation, behavioral problems and sleep disturbance.        Reports sleeping \"reasonably well\"       .  Vitals:    03/21/17 1720   BP: 141/85   Pulse: 64   Resp: 18   Temp: 97  F (36.1  C)   SpO2: 94%   Weight: (!) 319 lb (144.7 kg)       Physical Exam   Constitutional: He is oriented to person, place, and time. He appears well-developed and well-nourished.   HENT:   Head: Normocephalic.   Eyes: Conjunctivae are normal.   Neck: Normal range of motion.   Cardiovascular: Normal rate, regular rhythm and normal heart sounds.    No murmur heard.  Pulmonary/Chest: No respiratory distress. He has no wheezes. He has rales.   Bilateral bases   Abdominal: Soft. Bowel sounds are normal. He exhibits no distension. There is no " tenderness.   Musculoskeletal: Normal range of motion. He exhibits edema.   Lower extremities   Neurological: He is alert and oriented to person, place, and time.   Skin: Skin is warm.   Multiple arm and hand tattoos   Psychiatric: He has a normal mood and affect. His behavior is normal.         LABS:   INR 2.27    ASSESSMENT:      ICD-10-CM    1. Closed fracture of fourth lumbar vertebra with routine healing, unspecified fracture morphology, subsequent encounter S32.049D    2. Chronic kidney disease, stage III (moderate) N18.3    3. Chronic atrial fibrillation I48.2    4. Anticoagulated on Coumadin Z51.81     Z79.01    5. Cardiac pacemaker in situ Z95.0    6. Chronic obstructive pulmonary disease, unspecified COPD type J44.9    7. Essential hypertension with goal blood pressure less than 140/90 I10        PLAN:    Pain management-Continue oxycontin- discontinue the evening dose  Constipation-change Miralax  to scheduled  OK to use house standing orders  Anticoagulation management- Continue current dose of 4mg coumadin, Next INR 3/28/17  Left foot skin tear- Cleanse daily and apply a mepilex dressing  Right great toe abrasion-cleanse daily with NS , apply bacitracin and band aid until healed.        Electronically signed by: Yanet Cavazos CNP

## 2021-06-09 NOTE — PROGRESS NOTES
[facility]    DATE: 3/14/2017  NAME: David A Engelmann  : 10/7/1931           MR# [id number]     CODE STATUS:  FULL CODE  VISIT TYPE: Admission  FAC[facility] [[id number]]    ROOM:405  PRIMARY CARE PROVIDER: No Primary Care Provider Phone: None Fax:[phone]    History of Present Illness:   David A Engelmann is a 85 y.o. male with L4 vertebral fracture potentially related to a Spinal maneuver at chiropractic. He is in a brace but in significant pain. He has old T5 and L1 compression fractures as well. One Physician from [facility] brings up the possibility of ankylosing spondylitis but no specific diagnosis is made. Stewart reports some gastroesophageal reflux and I begin some Prilosec. He shows some irritation in the gluteal crease and I have introduced some zinc oxide for resolution of the irritation. Today he reports at rest being fairly comfortable although he's not certain the brace actually helps diminish the pain in his back. We will of course follow his [facility] consultants recommendations regard to the brace. He is willing to exercise to useful ability in an effort to get back home again. He reports that mostly while he sits up things  gradually start to ache.    Past Medical History:  Past Medical History:   Diagnosis Date     Abdominal aortic aneurysm     5.0cm     TEZ (acute kidney injury)      Anticoagulated on Coumadin      Atrial fibrillation     with pacemaker     BPH (benign prostatic hyperplasia)      CAD (coronary artery disease)     s/p CABG      Cardiac pacemaker in situ      Cervical spondylosis      Chronic back pain      Chronic kidney disease     stage III     Compression fracture of L1 lumbar vertebra      COPD (chronic obstructive pulmonary disease)      Diastolic congestive heart failure      Fracture of L1 vertebra      Fracture of T5 vertebra      Gout      Hyperlipidemia      Hypertension, essential      L4 vertebral  fracture     Possibbly post spinal manipulation     Obesity      BRITTNI on CPAP      Osteoporosis      Recurrent deep vein thrombosis (DVT)     with chronic anticoagulation     Rosacea      Ulcerative colitis        Past Surgical History:  Past Surgical History:   Procedure Laterality Date     CARDIAC PACEMAKER PLACEMENT      Leadless pacemaker     CORONARY ARTERY BYPASS GRAFT  2013     HERNIA REPAIR Bilateral     X3 with current recurrance     TONSILLECTOMY       VARICOSE VEIN SURGERY         Allergies:  Allergies   Allergen Reactions     Penicillins        Social History:  Social History     Social History     Marital status:      Spouse name: N/A     Number of children: N/A     Years of education: N/A     Occupational History     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 [employer]     Social History Main Topics     Smoking status: Not on file     Smokeless tobacco: Not on file     Alcohol use Not on file     Drug use: Not on file     Sexual activity: Not on file     Other Topics Concern     Not on file     Social History Narrative     lives with Wife ( she suffers from macular degeneration)  They have a single floor condo since 2000     3/2017       Family History:  Family History   Problem Relation Age of Onset     No Medical Problems Mother       98 yo     Stroke Father      Hemorrhagic     Dementia Sister      Alcoholism Brother      Hodgkin's lymphoma Daughter        Current Medications:  Current Outpatient Prescriptions   Medication Sig     allopurinol (ZYLOPRIM) 100 MG tablet Take 100 mg by mouth daily.     calcium carbonate-vitamin D3 (CALTRATE 600 PLUS D3) 600 mg(1,500mg) -400 unit per tablet Take 1 tablet by mouth 2 (two) times a day.      dutasteride (AVODART) 0.5 mg capsule Take 0.5 mg by mouth daily.     fluocinonide (LIDEX) 0.05 % external solution Apply 1 application topically 2 (two) times a day. Apply to face and ears     FLUTICASONE/VILANTEROL (BREO ELLIPTA INHL) Inhale 1 puff daily.     HYDROmorphone  (DILAUDID) 4 MG tablet Take 4 mg by mouth every 4 (four) hours as needed for pain.     ipratropium-albuterol (DUO-NEB) 0.5-2.5 mg/3 mL nebulizer Inhale 3 mL 4 (four) times a day.      metoprolol succinate (TOPROL-XL) 25 MG Take 25 mg by mouth daily.     omeprazole (PRILOSEC) 20 MG capsule Take 20 mg by mouth Daily before breakfast.     oxyCODONE (OXYCONTIN) 10 mg 12 hr tablet Take 10 mg by mouth every 12 (twelve) hours.     polyethylene glycol (MIRALAX) 17 gram packet Take 17 g by mouth daily as needed.      potassium chloride (KLOR-CON) 20 mEq packet Take 20 mEq by mouth daily.     senna-docusate (PERICOLACE) 8.6-50 mg tablet Take 3 tablets by mouth 2 (two) times a day.     sulfaSALAzine (AZULFIDINE) 500 mg tablet Take 500 mg by mouth daily.     tamsulosin (FLOMAX) 0.4 mg Cp24 Take 0.8 mg by mouth.     torsemide (DEMADEX) 20 MG tablet Take 20 mg by mouth 3 (three) times a day. 2 tabs in morning, 2 tabs in afternoon, 1 tab at night     zinc oxide 20 % ointment Apply topically as needed for dry skin. To gluteal crease qid prn     WARFARIN SODIUM (WARFARIN ORAL) Take by mouth. 3/13/17 INR 2.12  Take 4mg daily.  Next INR 3/16/17.       Review of Systems:  History obtained from chart review and the patient  General ROS: positive for  - fatigue  negative for - chills or fever  Psychological ROS: negative for - concentration difficulties, disorientation, hallucinations or memory difficulties  Ophthalmic ROS: negative for - decreased vision, double vision or loss of vision  ENT ROS: negative for - nasal congestion or sore throat  Respiratory ROS: no cough, shortness of breath, or wheezing  Cardiovascular ROS: no chest pain or dyspnea on exertion  Gastrointestinal ROS: no abdominal pain, change in bowel habits, or black or bloody stools  Genito-Urinary ROS: no dysuria, trouble voiding, or hematuria  Musculoskeletal ROS: Again at rest in the brace only moderate pain he acknowledges [place]'s recommendations to decrease his  "narcotics but I note that he's on both OxyContin and allotted a large dose  Neurological ROS: no TIA or stroke symptoms  Dermatological ROS:He denies active skin lesions       Physical Examination:  Visit Vitals     /74     Pulse 66     Temp 97  F (36.1  C)     Resp 20     Ht 6' 1\" (1.854 m)     Wt (!) 319 lb (144.7 kg)     SpO2 95%     BMI 42.09 kg/m2     General appearance: alert, appears stated age, cooperative, distracted, fatigued, flushed, moderate distress, moderately obese and slowed mentation  Head: Normocephalic, without obvious abnormality, atraumatic  Eyes: conjunctivae/corneas clear. PERRL, EOM's intact. Fundi benign.  Nose: Nares normal. Septum midline. Mucosa normal. No drainage or sinus tenderness.  Throat: lips, mucosa, and tongue normal; teeth and gums normal  Neck: no adenopathy, no carotid bruit, no JVD, supple, symmetrical, trachea midline and thyroid not enlarged, symmetric, no tenderness/mass/nodules  Back: symmetric, no curvature. ROM normal. No CVA tenderness.  Lungs: clear to auscultation bilaterally  Chest wall: no tenderness  Heart: regular rate and rhythm, S1, S2 normal, no murmur, click, rub or gallop  Abdomen: soft, non-tender; bowel sounds normal; no masses,  no organomegaly  Extremities: extremities normal, atraumatic, no cyanosis or edema  Pulses: 2+ and symmetric  Skin: multiple tattoos including on the back of his hand with individual letters for fingers  Neurologic: Mental status: Alert, oriented, thought content appropriate  Motor:globally diminished but symmetrical strength       Impression:  David A Engelmann is a 85 y.o. male with Fourth lumbar vertebral fracture in brace for rehabilitation with [facility] instructions to weaning him from the significant amounts of narcotic that he is on over the next month    1. Closed fracture of fourth lumbar vertebra with routine healing, unspecified fracture morphology, subsequent encounter     2. Chronic atrial fibrillation   "   3. Recurrent deep vein thrombosis (DVT)     4. Anticoagulated on Coumadin     5. Abdominal aortic aneurysm (AAA) without rupture     6. Essential hypertension with goal blood pressure less than 140/90     7. Cardiac pacemaker in situ     8. Obesity due to excess calories, unspecified obesity severity     9. Chronic bilateral low back pain without sciatica     10. Rosacea     11. Ulcerative colitis with complication, unspecified location     12. Chronic kidney disease, stage III (moderate)     13. Chronic obstructive pulmonary disease, unspecified COPD type     14. Chronic diastolic congestive heart failure     15. Coronary artery disease involving native coronary artery of native heart without angina pectoris     16. BRITTNI on CPAP           Plan: Have modified medications as above. Will continue to work with rehab. His physical presence is undoubtedly made more intimidating by the extensive tattooing noted    Electronically signed by: Romain Ramírez Sr., MD

## 2021-06-09 NOTE — PROGRESS NOTES
Code Status:  FULL CODE  Visit Type: Problem Visit     Facility:  [facility] [[id number]]         Facility Type: SNF (Skilled Nursing Facility, TCU)    History of Present Illness: David A Engelmann is a 85 y.o. male when seen today for follow-up from the TCU.  Patient recently admitted with an acute L4 lumbar fracture.  He is being treated with splinting.  He is morbidly obese.  Endurance fluctuates.  He is moving somewhat better than last week.  He is taking OxyContin 10 mg in the a.m. with Dilaudid 4 mg every 4 hours as needed for breakthrough pain.  I did schedule in a.m. dose prior to therapy.  He feels that this is helpful.He has a history of atrial fib with pacemaker.  He is on chronic anticoagulation.  History of COPD.  He does have some shortness of breath with exertion, further complicated by his congestive heart failure as well as large body habitus.  He has a wound on the bottom of his left foot.  He continues in therapy.      Active Ambulatory Problems     Diagnosis Date Noted     L4 vertebral fracture      Chronic kidney disease, stage III (moderate)      Recurrent deep vein thrombosis (DVT)      Atrial fibrillation      Abdominal aortic aneurysm      Anticoagulated on Coumadin      Diastolic congestive heart failure      Cardiac pacemaker in situ      Ulcerative colitis      CAD (coronary artery disease)      Chronic back pain      Hypertension, essential      BPH (benign prostatic hyperplasia)      COPD (chronic obstructive pulmonary disease)      Gout      Rosacea      Obesity      BRITTNI on CPAP      Resolved Ambulatory Problems     Diagnosis Date Noted     No Resolved Ambulatory Problems     Past Medical History:   Diagnosis Date     Abdominal aortic aneurysm      TEZ (acute kidney injury)      Anticoagulated on Coumadin      Atrial fibrillation      BPH (benign prostatic hyperplasia)      CAD (coronary artery disease)      Cardiac pacemaker in situ      Cervical spondylosis       Chronic back pain      Chronic kidney disease      Compression fracture of L1 lumbar vertebra      COPD (chronic obstructive pulmonary disease)      Diastolic congestive heart failure      Fracture of L1 vertebra      Fracture of T5 vertebra      Gout      Hyperlipidemia      Hypertension, essential      L4 vertebral fracture      Obesity      BRITTNI on CPAP      Osteoporosis      Recurrent deep vein thrombosis (DVT)      Rosacea      Ulcerative colitis        Current Outpatient Prescriptions   Medication Sig     allopurinol (ZYLOPRIM) 100 MG tablet Take 100 mg by mouth daily.     bisacodyl (DULCOLAX) 10 mg suppository Insert 10 mg into the rectum daily as needed.     calcium carbonate-vitamin D3 (CALTRATE 600 PLUS D3) 600 mg(1,500mg) -400 unit per tablet Take 1 tablet by mouth 2 (two) times a day.      dutasteride (AVODART) 0.5 mg capsule Take 0.5 mg by mouth daily.     fluocinonide (LIDEX) 0.05 % external solution Apply 1 application topically 2 (two) times a day. Apply to face and ears     FLUTICASONE/VILANTEROL (BREO ELLIPTA INHL) Inhale 1 puff daily.     HYDROmorphone (DILAUDID) 4 MG tablet Take 4 mg by mouth daily. 30 min Before therapy     ipratropium-albuterol (DUO-NEB) 0.5-2.5 mg/3 mL nebulizer Inhale 3 mL 4 (four) times a day.      metoprolol succinate (TOPROL-XL) 25 MG Take 25 mg by mouth daily.     omeprazole (PRILOSEC) 20 MG capsule Take 20 mg by mouth Daily before breakfast.     oxyCODONE (OXYCONTIN) 10 mg 12 hr tablet Take 10 mg by mouth once daily.      polyethylene glycol (MIRALAX) 17 gram packet Take 17 g by mouth daily.      potassium chloride (KLOR-CON) 20 mEq packet Take 20 mEq by mouth daily.     senna-docusate (PERICOLACE) 8.6-50 mg tablet Take 3 tablets by mouth 2 (two) times a day.     sulfaSALAzine (AZULFIDINE) 500 mg tablet Take 500 mg by mouth daily.     tamsulosin (FLOMAX) 0.4 mg Cp24 Take 0.8 mg by mouth.     torsemide (DEMADEX) 20 MG tablet Take 20 mg by mouth 3 (three) times a day. 2  tabs in morning, 2 tabs in afternoon, 1 tab at night     WARFARIN SODIUM (WARFARIN ORAL) Take 4 mg by mouth. 3/28/17 INR 1.84 Increase to 5mg T & F, 4mg all other days. Next INR 4/4.  3/20/17- INR 2.27-continue 4mg coumadin lazaro and recheck INR on 3/28/17  3/13/17 INR 2.12  Take 4mg daily.  Next INR 3/16/17.       Allergies   Allergen Reactions     Penicillins          Review of Systems   No fevers or chills. No headache, lightheadedness or dizziness. No SOB, chest pains or palpitations. Appetite is good. no nausea, vomiting, constipation or diarrhea. He is drinking prune juice.  No dysuria, frequency, burning or pain with urination.  Continued back pain however somewhat improved.    Physical Exam   PHYSICAL EXAMINATION:  Vital signs:   Vitals:    04/04/17 1146   BP: 132/81   Pulse: 64   Temp: 96.7  F (35.9  C)     General: Awake, Alert, oriented x3, appropriately, follows simple commands, conversant  HEENT:PERRLA, Pink conjunctiva, anicteric sclerae, moist oral mucosa, poor dentation.  NECK: Supple, without any lymphadenopathy, or masses  CVS:  S1  S2, without murmur or gallop.   LUNG: Decreased sounds in the bases.  No wheezes, rales or rhonci.  Large body habitus.  BACK: No kyphosis of the thoracic spine.  No focal inflammation around the lumbar spine.  Difficulty with rolling side to side in bed.  ABDOMEN: Soft, morbidly obese, nontender to palpation, with positive bowel sounds  EXTREMITIES: Good range of motion on both upper and lower extremities with generalized weakness, 1+ pedal edema, no calf tenderness  SKIN: Open area to bottom of left foot along the forefoot.  Minimal serous drainage.  Slight callus to the periwound nonraised.  Multiple tattoos.  Excoriation to the gluteal crease.  NEUROLOGIC: Intact, pulses palpable.  Some numbness and tingling to extremities.  PSYCHIATRIC: Cognition intact        Labs:    Recent Results (from the past 240 hour(s))   INR   Result Value Ref Range    INR 1.84 (H) 0.90 -  1.10   INR   Result Value Ref Range    INR 1.77 (H) 0.90 - 1.10         Assessment/Plan:  1. Closed fracture of fourth lumbar vertebra with routine healing, unspecified fracture morphology, subsequent encounter     2. Chronic atrial fibrillation     3. Anticoagulated on Coumadin     4. Chronic obstructive pulmonary disease, unspecified COPD type     5. Chronic kidney disease, stage III (moderate)     6. BRITTNI on CPAP     7. Essential hypertension with goal blood pressure less than 140/90     8. Cardiac pacemaker in situ     9. Recurrent deep vein thrombosis (DVT)     10. Abdominal aortic aneurysm (AAA) without rupture     11. Foot ulcer       L4 fracture being treated conservatively.  He continues on OxyContin 10 mg in the a.m. and Dilaudid for breakthrough pain.  Pain is somewhat improved.  He does continue in therapy.  Large body habitus which exacerbates pain and makes moving difficult.  Atrial fib on chronic anticoagulation.  INR is pending.  COPD.  CHF.  Compensated.  Left had a foot wound.  I will order Silvadene 1% topical covering with foam dressing daily.  Aquaphor to lower extremities for dryness.  Chronic venous stasis changes to lower extremities.  Excoriation in the great gluteal crease.  Will treat with [employer]      Electronically signed by: Luna Jaquez, CNP

## 2021-06-09 NOTE — PROGRESS NOTES
[facility]    DATE: 3/23/2017    NAME: David A Engelmann  : 10/7/1931           MR# [id number]     CODE STATUS:  FULL CODE      VISIT TYPE: Problem   FACILITY: [facility] [[id number]]    ROOM: 405    PRIMARY CARE PROVIDER: No Primary Care Provider Phone: None Fax:[phone]    History of Present Illness:   David A Engelmann is a 85 y.o. male with Acute L4 vertebral fracture. His pain has stabilized to only some mild pelvic pain on prolonged standing and he is making steady progress toward a more functional lifestyle including upright posture and ambulation. Spontaneously he's trying to decreases the allotted end OxyContin in an effort to improve his Bowell function. He is surprised were considering discharge at some point despite my early assurance that he would be discharged from here once stabilized. He is pleasant cooperative and enthusiastic about being able to leave here and get back to his home environment    Past Medical History:  Past Medical History:   Diagnosis Date     Abdominal aortic aneurysm     5.0cm     TEZ (acute kidney injury)      Anticoagulated on Coumadin      Atrial fibrillation     with pacemaker     BPH (benign prostatic hyperplasia)      CAD (coronary artery disease)     s/p CABG      Cardiac pacemaker in situ      Cervical spondylosis      Chronic back pain      Chronic kidney disease     stage III     Compression fracture of L1 lumbar vertebra      COPD (chronic obstructive pulmonary disease)      Diastolic congestive heart failure      Fracture of L1 vertebra      Fracture of T5 vertebra      Gout      Hyperlipidemia      Hypertension, essential      L4 vertebral fracture     Possibbly post spinal manipulation     Obesity      BRITTNI on CPAP      Osteoporosis      Recurrent deep vein thrombosis (DVT)     with chronic anticoagulation     Rosacea      Ulcerative colitis        Allergies:  Allergies   Allergen Reactions     Penicillins         Current Medications:  Current Outpatient Prescriptions   Medication Sig     allopurinol (ZYLOPRIM) 100 MG tablet Take 100 mg by mouth daily.     calcium carbonate-vitamin D3 (CALTRATE 600 PLUS D3) 600 mg(1,500mg) -400 unit per tablet Take 1 tablet by mouth 2 (two) times a day.      dutasteride (AVODART) 0.5 mg capsule Take 0.5 mg by mouth daily.     fluocinonide (LIDEX) 0.05 % external solution Apply 1 application topically 2 (two) times a day. Apply to face and ears     FLUTICASONE/VILANTEROL (BREO ELLIPTA INHL) Inhale 1 puff daily.     HYDROmorphone (DILAUDID) 4 MG tablet Take 4 mg by mouth every 4 (four) hours as needed for pain.     ipratropium-albuterol (DUO-NEB) 0.5-2.5 mg/3 mL nebulizer Inhale 3 mL 4 (four) times a day.      metoprolol succinate (TOPROL-XL) 25 MG Take 25 mg by mouth daily.     omeprazole (PRILOSEC) 20 MG capsule Take 20 mg by mouth Daily before breakfast.     oxyCODONE (OXYCONTIN) 10 mg 12 hr tablet Take 10 mg by mouth every 12 (twelve) hours.     polyethylene glycol (MIRALAX) 17 gram packet Take 17 g by mouth daily as needed.      potassium chloride (KLOR-CON) 20 mEq packet Take 20 mEq by mouth daily.     senna-docusate (PERICOLACE) 8.6-50 mg tablet Take 3 tablets by mouth 2 (two) times a day.     sulfaSALAzine (AZULFIDINE) 500 mg tablet Take 500 mg by mouth daily.     tamsulosin (FLOMAX) 0.4 mg Cp24 Take 0.8 mg by mouth.     torsemide (DEMADEX) 20 MG tablet Take 20 mg by mouth 3 (three) times a day. 2 tabs in morning, 2 tabs in afternoon, 1 tab at night     WARFARIN SODIUM (WARFARIN ORAL) Take 4 mg by mouth. 3/20/17- INR 2.27-continue 4mg coumadin lazaro and recheck INR on 3/28/17    3/13/17 INR 2.12  Take 4mg daily.  Next INR 3/16/17.       Review of Systems:  History obtained from chart review and the patient  Respiratory ROS: no cough, shortness of breath, or wheezing  Cardiovascular ROS: no chest pain or dyspnea on exertion  Gastrointestinal ROS: no abdominal pain, change in bowel  habits, or black or bloody stools  Genito-Urinary ROS: no dysuria, trouble voiding, or hematuria  Musculoskeletal ROS: On prolonged standing he has some ill-defined mild pain at the edges of his hips(greater trochanter?)  Neurological ROS: no TIA or stroke symptoms  Dermatological ROS: the report skin is intact         Laboratory:  Recent Labs      03/21/17   0600   INR  2.27*     Continue current regimen INR next week    Physical Examination:  /73  Pulse 60  Temp 97.3  F (36.3  C)  Resp 18  Wt (!) 301 lb 1.6 oz (136.6 kg)  SpO2 95%  BMI 39.73 kg/m2  General appearance: alert, appears stated age, cooperative, mild distress, moderately obese and slowed mentation  Neck: no adenopathy, no carotid bruit, no JVD, supple, symmetrical, trachea midline and thyroid not enlarged, symmetric, no tenderness/mass/nodules  Lungs: clear to auscultation bilaterally  Heart: regular rate and rhythm, S1, S2 normal, no murmur, click, rub or gallop  Abdomen: soft, non-tender; bowel sounds normal; no masses,  no organomegaly and obese  Extremities: mmoving all extremities if generally diminished strength  Pulses: 2+ and symmetric  Neurologic: Mental status: very pleasant not vitally interested in the rehabilitative process but enthused about the thought of going home  Motor:globally diminish strength but no localization       Impression:  David A Engelmann is a 85 y.o. male with Lumbar vertebral fracture    1. Closed fracture of fourth lumbar vertebra with routine healing, unspecified fracture morphology, subsequent encounter     2. Chronic atrial fibrillation     3. Recurrent deep vein thrombosis (DVT)     4. Anticoagulated on Coumadin     5. Abdominal aortic aneurysm (AAA) without rupture     6. Essential hypertension with goal blood pressure less than 140/90     7. Cardiac pacemaker in situ     8. Obesity due to excess calories, unspecified obesity severity     9. Chronic bilateral low back pain without sciatica     10.  Rosacea     11. Ulcerative colitis with complication, unspecified location     12. Chronic kidney disease, stage III (moderate)     13. Chronic obstructive pulmonary disease, unspecified COPD type     14. Chronic diastolic congestive heart failure     15. Coronary artery disease involving native coronary artery of native heart without angina pectoris     16. BRITTNI on CPAP     17. Benign prostatic hyperplasia, presence of lower urinary tract symptoms unspecified, unspecified morphology         Plan: Will continue rehab to try to achieve his full potential hopefully allowing him to resume his previous life    Electronically signed by: Romain Ramírez Sr., MD

## 2021-06-09 NOTE — PROGRESS NOTES
Code Status:  FULL CODE  Visit Type: Problem Visit     Facility:  [facility] [[id number]]         Facility Type: SNF (Skilled Nursing Facility, TCU)    History of Present Illness: David A Engelmann is a 85 y.o. male when seen today for follow-up from the TCU.  Patient recently admitted with an acute L4 lumbar fracture.  He is being treated with splinting.  He has continue on OxyContin which was scheduled twice daily.  This was recently decreased to once daily.  He has Dilaudid 4 mg every 4 hours as needed for breakthrough pain.  He reports increased pain.  Therapy reports difficulty completing his therapies due to pain.  He has not been utilizing her asking often for his as needed's.  I will schedule a dose in the morning 30 minutes prior to therapies coordinating with his therapy schedule.  He has a history of atrial fib with pacemaker.  He is on chronic anticoagulation.  History of COPD.  He does have some shortness of breath with exertion, further complicated by his congestive heart failure as well as large body habitus.  He continues in therapy.      Active Ambulatory Problems     Diagnosis Date Noted     L4 vertebral fracture      Chronic kidney disease, stage III (moderate)      Recurrent deep vein thrombosis (DVT)      Atrial fibrillation      Abdominal aortic aneurysm      Anticoagulated on Coumadin      Diastolic congestive heart failure      Cardiac pacemaker in situ      Ulcerative colitis      CAD (coronary artery disease)      Chronic back pain      Hypertension, essential      BPH (benign prostatic hyperplasia)      COPD (chronic obstructive pulmonary disease)      Gout      Rosacea      Obesity      BRITTNI on CPAP      Resolved Ambulatory Problems     Diagnosis Date Noted     No Resolved Ambulatory Problems     Past Medical History:   Diagnosis Date     Abdominal aortic aneurysm      TEZ (acute kidney injury)      Anticoagulated on Coumadin      Atrial fibrillation      BPH  (benign prostatic hyperplasia)      CAD (coronary artery disease)      Cardiac pacemaker in situ      Cervical spondylosis      Chronic back pain      Chronic kidney disease      Compression fracture of L1 lumbar vertebra      COPD (chronic obstructive pulmonary disease)      Diastolic congestive heart failure      Fracture of L1 vertebra      Fracture of T5 vertebra      Gout      Hyperlipidemia      Hypertension, essential      L4 vertebral fracture      Obesity      BRITTNI on CPAP      Osteoporosis      Recurrent deep vein thrombosis (DVT)      Rosacea      Ulcerative colitis        Current Outpatient Prescriptions   Medication Sig     allopurinol (ZYLOPRIM) 100 MG tablet Take 100 mg by mouth daily.     calcium carbonate-vitamin D3 (CALTRATE 600 PLUS D3) 600 mg(1,500mg) -400 unit per tablet Take 1 tablet by mouth 2 (two) times a day.      dutasteride (AVODART) 0.5 mg capsule Take 0.5 mg by mouth daily.     fluocinonide (LIDEX) 0.05 % external solution Apply 1 application topically 2 (two) times a day. Apply to face and ears     FLUTICASONE/VILANTEROL (BREO ELLIPTA INHL) Inhale 1 puff daily.     HYDROmorphone (DILAUDID) 4 MG tablet Take 4 mg by mouth every 4 (four) hours as needed for pain.     ipratropium-albuterol (DUO-NEB) 0.5-2.5 mg/3 mL nebulizer Inhale 3 mL 4 (four) times a day.      metoprolol succinate (TOPROL-XL) 25 MG Take 25 mg by mouth daily.     omeprazole (PRILOSEC) 20 MG capsule Take 20 mg by mouth Daily before breakfast.     oxyCODONE (OXYCONTIN) 10 mg 12 hr tablet Take 10 mg by mouth every 12 (twelve) hours.     polyethylene glycol (MIRALAX) 17 gram packet Take 17 g by mouth daily as needed.      potassium chloride (KLOR-CON) 20 mEq packet Take 20 mEq by mouth daily.     senna-docusate (PERICOLACE) 8.6-50 mg tablet Take 3 tablets by mouth 2 (two) times a day.     sulfaSALAzine (AZULFIDINE) 500 mg tablet Take 500 mg by mouth daily.     tamsulosin (FLOMAX) 0.4 mg Cp24 Take 0.8 mg by mouth.      torsemide (DEMADEX) 20 MG tablet Take 20 mg by mouth 3 (three) times a day. 2 tabs in morning, 2 tabs in afternoon, 1 tab at night     WARFARIN SODIUM (WARFARIN ORAL) Take 4 mg by mouth. 3/20/17- INR 2.27-continue 4mg coumadin lazaro and recheck INR on 3/28/17    3/13/17 INR 2.12  Take 4mg daily.  Next INR 3/16/17.       Allergies   Allergen Reactions     Penicillins          Review of Systems   No fevers or chills. No headache, lightheadedness or dizziness. No SOB, chest pains or palpitations. Appetite is good. No nausea, vomiting, constipation or diarrhea. No dysuria, frequency, burning or pain with urination.  Patient complains of pain.  He continues on OxyContin scheduled however his p.m. dose was discontinued.  On Dilaudid for breakthrough pain however he has not been asking for routinely.    Physical Exam   PHYSICAL EXAMINATION:  Vital signs:   Vitals:    03/28/17 2040   BP: 135/84   Pulse: 62   Resp: 18   Temp: 98.5  F (36.9  C)   SpO2: 94%     General: Awake, Alert, oriented x3, appropriately, follows simple commands, conversant  HEENT:PERRLA, Pink conjunctiva, anicteric sclerae, moist oral mucosa, poor dentation.  NECK: Supple, without any lymphadenopathy, or masses  CVS:  S1  S2, without murmur or gallop.   LUNG: Decreased sounds in the bases.  No wheezes, rales or rhonci.  Large body habitus.  BACK: No kyphosis of the thoracic spine.  No focal inflammation around the lumbar spine.  He is in his bracing.  ABDOMEN: Soft, morbidly obese, nontender to palpation, with positive bowel sounds  EXTREMITIES: Good range of motion on both upper and lower extremities with generalized weakness, 1+ pedal edema, no calf tenderness  SKIN: Warm and dry, no rashes or erythema noted.  Multiple tattoos.  NEUROLOGIC: Intact, pulses palpable.  Some numbness and tingling to extremities.  PSYCHIATRIC: Cognition intact        Labs:    Recent Results (from the past 240 hour(s))   INR   Result Value Ref Range    INR 2.27 (H) 0.90 -  1.10   INR   Result Value Ref Range    INR 1.84 (H) 0.90 - 1.10         Assessment/Plan:  1. Closed fracture of fourth lumbar vertebra with routine healing, unspecified fracture morphology, subsequent encounter     2. Chronic atrial fibrillation     3. Obesity due to excess calories, unspecified obesity severity     4. COPD (chronic obstructive pulmonary disease)     5. Chronic diastolic congestive heart failure     6. Abdominal aortic aneurysm (AAA) without rupture     7. Coronary artery disease involving native coronary artery of native heart without angina pectoris     8. Recurrent deep vein thrombosis (DVT)     9. Chronic bilateral low back pain without sciatica     10. Cardiac pacemaker in situ     11. Chronic kidney disease, stage III (moderate)     12. BRITTNI on CPAP     13. Essential hypertension with goal blood pressure less than 140/90     14. Ulcerative colitis with complication, unspecified location     15. Benign prostatic hyperplasia, presence of lower urinary tract symptoms unspecified, unspecified morphology       Patient with recent acute L4 fracture.  He is reporting increased pain.  His OxyContin 15 mg twice daily was recently reduced to once daily.  He does have Dilaudid 4 mg every 4 hours as needed however he is not asking for it routinely.  I will schedule IV milligrams 30 minutes prior to his therapies in the a.m.  I will have nursing coordinate with therapy.  Atrial fib on chronic anticoagulation.  INR is pending.  COPD.  He does have some shortness of breath of breath with exertion.  CHF.  Compensated.  He is actually down in weight from admit.  Will follow up with laboratories.  Patient continues in therapy.        40 minutes spent of which greater than 50% was face to face communication with the patient about above plan of care    Electronically signed by: Luna Jaquez CNP

## 2021-06-10 NOTE — PROGRESS NOTES
Code Status:  FULL CODE  Visit Type: Problem Visit     Facility:  [facility] [[id number]]         Facility Type: SNF (Skilled Nursing Facility, TCU)    History of Present Illness: David A Engelmann is a 85 y.o. male when seen today for follow-up from the TCU.  Patient recently admitted with an acute L4 lumbar fracture.  He is being treated with splinting.  He is morbidly obese.  Endurance fluctuates.  He is moving somewhat better than last week.  He is taking OxyContin 10 mg in the a.m. with Dilaudid 4 mg every 4 hours as needed for breakthrough pain. Today we discuss attempting to d/c his long acting pain medication. He tells me he has OxyContin at home and he only take half a pill. He is unable to tell me the dosage. I inform him if we discontinue it I would not want him to take any at home. His wife conquers that he knows how to take it.  He is moving much better. He continues in back support. He has a history of atrial fib with pacemaker.  He is on chronic anticoagulation.  History of COPD.  He does have some shortness of breath with exertion, further complicated by his congestive heart failure as well as large body habitus.  He has a wound on the bottom of his left foot as well as his right great toe. His right heel has healed. Today pt reports nausea and diarrhea. No fever or chills.  He continues in therapy.      Active Ambulatory Problems     Diagnosis Date Noted     L4 vertebral fracture      Chronic kidney disease, stage III (moderate)      Recurrent deep vein thrombosis (DVT)      Atrial fibrillation      Abdominal aortic aneurysm      Anticoagulated on Coumadin      Diastolic congestive heart failure      Cardiac pacemaker in situ      Ulcerative colitis      CAD (coronary artery disease)      Chronic back pain      Hypertension, essential      BPH (benign prostatic hyperplasia)      COPD (chronic obstructive pulmonary disease)      Gout      Rosacea      Obesity      BRITTNI on CPAP       Abrasion of foot or toe 04/06/2017     Resolved Ambulatory Problems     Diagnosis Date Noted     No Resolved Ambulatory Problems     Past Medical History:   Diagnosis Date     Abdominal aortic aneurysm      TEZ (acute kidney injury)      Anticoagulated on Coumadin      Atrial fibrillation      BPH (benign prostatic hyperplasia)      CAD (coronary artery disease)      Cardiac pacemaker in situ      Cervical spondylosis      Chronic back pain      Chronic kidney disease      Compression fracture of L1 lumbar vertebra      COPD (chronic obstructive pulmonary disease)      Diastolic congestive heart failure      Fracture of L1 vertebra      Fracture of T5 vertebra      Gout      Hyperlipidemia      Hypertension, essential      L4 vertebral fracture      Obesity      BRITTNI on CPAP      Osteoporosis      Recurrent deep vein thrombosis (DVT)      Rosacea      Ulcerative colitis        Current Outpatient Prescriptions   Medication Sig     allopurinol (ZYLOPRIM) 100 MG tablet Take 100 mg by mouth daily.     bisacodyl (DULCOLAX) 10 mg suppository Insert 10 mg into the rectum daily as needed.     calcium carbonate-vitamin D3 (CALTRATE 600 PLUS D3) 600 mg(1,500mg) -400 unit per tablet Take 1 tablet by mouth 2 (two) times a day.      dutasteride (AVODART) 0.5 mg capsule Take 0.5 mg by mouth daily.     fluocinonide (LIDEX) 0.05 % external solution Apply 1 application topically 2 (two) times a day. Apply to face and ears     FLUTICASONE/VILANTEROL (BREO ELLIPTA INHL) Inhale 1 puff daily.     HYDROmorphone (DILAUDID) 4 MG tablet Take 4 mg by mouth every 4 (four) hours as needed for pain. 30 min Before therapy     ipratropium-albuterol (DUO-NEB) 0.5-2.5 mg/3 mL nebulizer Inhale 3 mL 4 (four) times a day.      metoprolol succinate (TOPROL-XL) 25 MG Take 25 mg by mouth daily.     omeprazole (PRILOSEC) 20 MG capsule Take 20 mg by mouth Daily before breakfast.     oxyCODONE (OXYCONTIN) 10 mg 12 hr tablet Take 10 mg by mouth once  daily.      polyethylene glycol (MIRALAX) 17 gram packet Take 17 g by mouth daily.      potassium chloride (KLOR-CON) 20 mEq packet Take 20 mEq by mouth daily.     senna-docusate (PERICOLACE) 8.6-50 mg tablet Take 3 tablets by mouth 2 (two) times a day.     sulfaSALAzine (AZULFIDINE) 500 mg tablet Take 500 mg by mouth daily.     tamsulosin (FLOMAX) 0.4 mg Cp24 Take 0.8 mg by mouth.     torsemide (DEMADEX) 20 MG tablet Take 20 mg by mouth 3 (three) times a day. 2 tabs in morning, 2 tabs in afternoon, 1 tab at night     WARFARIN SODIUM (WARFARIN ORAL) Take 4 mg by mouth daily.        Allergies   Allergen Reactions     Penicillins          Review of Systems   No fevers or chills. No headache, lightheadedness or dizziness. No SOB, chest pains or palpitations. Appetite is good. no nausea, vomiting, constipation. No with diarrhea. No dysuria, frequency, burning or pain with urination.  Back pain improved.     Physical Exam   PHYSICAL EXAMINATION:  Vital signs:   Vitals:    04/18/17 1912   BP: 143/76   Pulse: 76   Resp: 20   Temp: 97.7  F (36.5  C)   SpO2: 98%     General: Awake, Alert, oriented x3, appropriately, follows simple commands, conversant. Wife present.   HEENT:PERRLA, Pink conjunctiva, anicteric sclerae, moist oral mucosa, poor dentation.  NECK: Supple, without any lymphadenopathy, or masses  CVS:  S1  S2, without murmur or gallop.   LUNG: Decreased sounds in the bases.  No wheezes, rales or rhonci.  Large body habitus.  BACK: No kyphosis of the thoracic spine.  No focal inflammation around the lumbar spine.  Continues in lumbar support binder.   ABDOMEN: Soft, morbidly obese, slightly tender across midline to palpation, with hyperactive bowel sounds  EXTREMITIES: Good range of motion on both upper and lower extremities with generalized weakness, 1+ pedal edema, no calf tenderness  SKIN: Open area to bottom of left foot along the forefoot.  Minimal serous drainage.  Slight callus to the periwound nonraised.   Multiple tattoos.  Excoriation to the gluteal crease. Heels healed. Right great toe with scabbed lesion dorsal region with minimal serosanguinous drainage.   NEUROLOGIC: Intact, pulses palpable.  Some numbness and tingling to extremities.  PSYCHIATRIC: Cognition intact        Labs:    Recent Results (from the past 240 hour(s))   INR   Result Value Ref Range    INR 3.36 (H) 0.90 - 1.10   Potassium   Result Value Ref Range    Potassium 3.4 (L) 3.5 - 5.0 mmol/L   INR   Result Value Ref Range    INR 2.98 (H) 0.90 - 1.10   Potassium   Result Value Ref Range    Potassium 3.6 3.5 - 5.0 mmol/L   INR   Result Value Ref Range    INR 1.77 (H) 0.90 - 1.10         Assessment/Plan:  1. Closed fracture of fourth lumbar vertebra with routine healing, unspecified fracture morphology, subsequent encounter     2. Chronic atrial fibrillation     3. Anticoagulated on Coumadin     4. Abrasion of foot or toe, left, subsequent encounter     5. Foot ulcer     6. Diarrhea     7. Chronic kidney disease, stage III (moderate)     8. Cardiac pacemaker in situ     9. Ulcerative colitis with complication, unspecified location     10. Recurrent deep vein thrombosis (DVT)     11. Obesity due to excess calories, unspecified obesity severity     12. Chronic obstructive pulmonary disease, unspecified COPD type     13. Chronic diastolic congestive heart failure       Pt with acute onset of loose stools. He is without fever or chills. Slight nausea without emesis. He has been on jimy colace 3 tabs BID. He has also been drinking Prune juice. He has underlying ulcerative colitis. No blood in his stools. Will hold bowel meds. I will order prn imodium and see if this clears. Pain improved however pt reluctant to come off his long acting pain med telling me he has is own prescription at home. He is using Diluadid about 3 times a day for break through pain. He continues in a brace. I will have him follow up with ortho for recommendations. Chronic atrial fib on  chronic anticoagulation. INR pending. Mx wounds to feet improving.     Electronically signed by: Luna Jaquez CNP

## 2021-06-10 NOTE — PROGRESS NOTES
[facility]    DATE: 2017  NAME: David A Engelmann  : 10/7/1931           MR# [id number]     CODE STATUS:  FULL CODE    VISIT TYPE: Discharge   FACILITY: [facility] [[id number]]        ROOM: 405  PRIMARY CARE PROVIDER: No Primary Care Provider Phone: None Fax:[phone]     History Course:  David A Engelmann is a 85 y.o. male with Close fracture of the fourth lumbar vertebrae. He is made wonderful progress in with substantial relief of pain he's close to discharge. He reassures me he has lots of narcotics at home so he doesn't really care what I do with his narcotic prescriptions. I have agreed that I would limit the amount of narcotic I would give or recommend for him despite his assurance that he can get access to as much as he wants. He is more than ready to go home today and feels his wife should be able to take care of    Past Medical History:  Past Medical History:   Diagnosis Date     Abdominal aortic aneurysm     5.0cm     TEZ (acute kidney injury)      Anticoagulated on Coumadin      Atrial fibrillation     with pacemaker     BPH (benign prostatic hyperplasia)      CAD (coronary artery disease)     s/p CABG      Cardiac pacemaker in situ      Cervical spondylosis      Chronic back pain      Chronic kidney disease     stage III     Compression fracture of L1 lumbar vertebra      COPD (chronic obstructive pulmonary disease)      Diastolic congestive heart failure      Fracture of L1 vertebra      Fracture of T5 vertebra      Gout      Hyperlipidemia      Hypertension, essential      L4 vertebral fracture     Possibbly post spinal manipulation     Obesity      BRITTNI on CPAP      Osteoporosis      Recurrent deep vein thrombosis (DVT)     with chronic anticoagulation     Rosacea      Ulcerative colitis        Allergies:  Allergies   Allergen Reactions     Penicillins        Discharge Medications:  Current Outpatient Prescriptions   Medication Sig      allopurinol (ZYLOPRIM) 100 MG tablet Take 100 mg by mouth daily.     bisacodyl (DULCOLAX) 10 mg suppository Insert 10 mg into the rectum daily as needed.     calcium carbonate-vitamin D3 (CALTRATE 600 PLUS D3) 600 mg(1,500mg) -400 unit per tablet Take 1 tablet by mouth 2 (two) times a day.      dutasteride (AVODART) 0.5 mg capsule Take 0.5 mg by mouth daily.     fluocinonide (LIDEX) 0.05 % external solution Apply 1 application topically 2 (two) times a day. Apply to face and ears     FLUTICASONE/VILANTEROL (BREO ELLIPTA INHL) Inhale 1 puff daily.     HYDROmorphone (DILAUDID) 4 MG tablet Take 4 mg by mouth every 4 (four) hours as needed for pain. 30 min Before therapy     ipratropium-albuterol (DUO-NEB) 0.5-2.5 mg/3 mL nebulizer Inhale 3 mL 4 (four) times a day.      metoprolol succinate (TOPROL-XL) 25 MG Take 25 mg by mouth daily.     omeprazole (PRILOSEC) 20 MG capsule Take 20 mg by mouth Daily before breakfast.     oxyCODONE (OXYCONTIN) 10 mg 12 hr tablet Take 10 mg by mouth once daily.      polyethylene glycol (MIRALAX) 17 gram packet Take 17 g by mouth daily.      potassium chloride (KLOR-CON) 20 mEq packet Take 20 mEq by mouth daily.     senna-docusate (PERICOLACE) 8.6-50 mg tablet Take 3 tablets by mouth 2 (two) times a day.     sulfaSALAzine (AZULFIDINE) 500 mg tablet Take 500 mg by mouth daily.     tamsulosin (FLOMAX) 0.4 mg Cp24 Take 0.8 mg by mouth.     torsemide (DEMADEX) 20 MG tablet Take 20 mg by mouth 3 (three) times a day. 2 tabs in morning, 2 tabs in afternoon, 1 tab at night     WARFARIN SODIUM (WARFARIN ORAL) Take 3 mg by mouth daily.        Review of Systems:  History obtained from chart review and the patient  Respiratory ROS: no cough, shortness of breath, or wheezing  Cardiovascular ROS: no chest pain or dyspnea on exertion  Gastrointestinal ROS: no abdominal pain, change in bowel habits, or black or bloody stools  Genito-Urinary ROS: no dysuria, trouble voiding, or hematuria  Musculoskeletal  ROS: If you twist the wrong way he gets back pain but he has worked out how to move around without exacerbation of the pain and in principle is happy to be  using less or no narcotics  Neurological ROS: no TIA or stroke symptoms  Dermatological ROS: he has pressure ulcers on the bottom of both feet left foot on the lateral plantar surface near the toes clean and dry but still a denuded patch steady healing on the right foot he has a single pressure at the base of the metatarsal and distally on the toll both of which are essentially dried upHe denies any pain in either lesion and understands you'll need to continue dressing changes at least once a day until they are healed       Physical Examination:  /81  Pulse 68  Temp 97  F (36.1  C)  Resp 16  Wt (!) 309 lb 8 oz (140.4 kg)  SpO2 96%  BMI 40.83 kg/m2  General appearance: alert, appears stated age, cooperative, flushed, no distress and moderately obese  Neck: no adenopathy, no carotid bruit, no JVD, supple, symmetrical, trachea midline and thyroid not enlarged, symmetric, no tenderness/mass/nodules  Lungs: clear to auscultation bilaterally  Heart: regular rate and rhythm, S1, S2 normal, no murmur, click, rub or gallop  Abdomen: soft, non-tender; bowel sounds normal; no masses,  no organomegaly  Extremities: pressure ulcers lateral plantar surface of the left foot and two punctate areas on the right foot with the right foot mostly dried out  Pulses: 2+ and symmetric  Skin: as above pressure ulcers and a pressure sore all clean dry but in early stages of healing  Neurologic: Mental status: Alert, oriented, thought content appropriate  Motor:diminished lower extremity strength generally but as he's responding to therapy increasing motion without pain       Laboratory: None          Discharge Diagnosis:  David A Engelmann is a 85 y.o. male with Close fracture of the fourth lumbar vertebrae and pressure ulcers on the bottom of his feet    1. Closed fracture  of fourth lumbar vertebra with routine healing, unspecified fracture morphology, subsequent encounter     2. Recurrent deep vein thrombosis (DVT)     3. Chronic atrial fibrillation     4. Abdominal aortic aneurysm (AAA) without rupture     5. Essential hypertension with goal blood pressure less than 140/90     6. Coronary artery disease involving native coronary artery of native heart without angina pectoris     7. Chronic diastolic congestive heart failure     8. BRITTNI on CPAP     9. Abrasion of foot or toe, left, subsequent encounter     10. Cardiac pacemaker in situ     11. Benign prostatic hyperplasia, presence of lower urinary tract symptoms unspecified, unspecified morphology     12. Obesity due to excess calories, unspecified obesity severity     13. Ulcerative colitis with complication, unspecified location     14. Chronic kidney disease, stage III (moderate)     15. Chronic bilateral low back pain without sciatica     16. Anticoagulated on Coumadin         Discharge Plan: Daily dressing changes for the pressure ulcers with padding to make sure that he doesn't continue to delay healing. Current supply of narcotics is discharged with them with instructions to decrease and stop. Follow up with primary. INR is 2.67 today so same Coumadin and recheck in one week    Electronically signed by: Romain Ramírez Sr., MD

## 2021-06-10 NOTE — PROGRESS NOTES
[facility]    DATE: 2017    NAME: David A Engelmann  : 10/7/1931           MR# [id number]     CODE STATUS:  FULL CODE      VISIT TYPE: Problem   FACILITY: [facility] [[id number]]    ROOM: 405    PRIMARY CARE PROVIDER: No Primary Care Provider Phone: None Fax:[phone]    History of Present Illness:   David A Engelmann is a 85 y.o. male with A recent L4 fracture. He has had chronic back problems with Immobility but has made slow but steady progress toward regaining his full functional abilities. He no longer feels a sharp poking his back when he's moves to stand up straight forward out of the chair and he reports that he is able to get out of bed and move around with increasing strength as time goes on. He is on only two pain pills a day one MS Contin and one Hydromorphone. He is eager for discharge even at this time but certainly in the middle of next week is his plan. He feels he will easily be able to manage his self-care with help    Past Medical History:  Past Medical History:   Diagnosis Date     Abdominal aortic aneurysm     5.0cm     TEZ (acute kidney injury)      Anticoagulated on Coumadin      Atrial fibrillation     with pacemaker     BPH (benign prostatic hyperplasia)      CAD (coronary artery disease)     s/p CABG      Cardiac pacemaker in situ      Cervical spondylosis      Chronic back pain      Chronic kidney disease     stage III     Compression fracture of L1 lumbar vertebra      COPD (chronic obstructive pulmonary disease)      Diastolic congestive heart failure      Fracture of L1 vertebra      Fracture of T5 vertebra      Gout      Hyperlipidemia      Hypertension, essential      L4 vertebral fracture     Possibbly post spinal manipulation     Obesity      BRITTNI on CPAP      Osteoporosis      Recurrent deep vein thrombosis (DVT)     with chronic anticoagulation     Rosacea      Ulcerative colitis        Allergies:  Allergies   Allergen  Reactions     Penicillins        Current Medications:  Current Outpatient Prescriptions   Medication Sig     allopurinol (ZYLOPRIM) 100 MG tablet Take 100 mg by mouth daily.     bisacodyl (DULCOLAX) 10 mg suppository Insert 10 mg into the rectum daily as needed.     calcium carbonate-vitamin D3 (CALTRATE 600 PLUS D3) 600 mg(1,500mg) -400 unit per tablet Take 1 tablet by mouth 2 (two) times a day.      dutasteride (AVODART) 0.5 mg capsule Take 0.5 mg by mouth daily.     fluocinonide (LIDEX) 0.05 % external solution Apply 1 application topically 2 (two) times a day. Apply to face and ears     FLUTICASONE/VILANTEROL (BREO ELLIPTA INHL) Inhale 1 puff daily.     HYDROmorphone (DILAUDID) 4 MG tablet Take 4 mg by mouth every 4 (four) hours as needed for pain. 30 min Before therapy     ipratropium-albuterol (DUO-NEB) 0.5-2.5 mg/3 mL nebulizer Inhale 3 mL 4 (four) times a day.      metoprolol succinate (TOPROL-XL) 25 MG Take 25 mg by mouth daily.     omeprazole (PRILOSEC) 20 MG capsule Take 20 mg by mouth Daily before breakfast.     oxyCODONE (OXYCONTIN) 10 mg 12 hr tablet Take 10 mg by mouth once daily.      polyethylene glycol (MIRALAX) 17 gram packet Take 17 g by mouth daily.      potassium chloride (KLOR-CON) 20 mEq packet Take 20 mEq by mouth daily.     senna-docusate (PERICOLACE) 8.6-50 mg tablet Take 3 tablets by mouth 2 (two) times a day.     sulfaSALAzine (AZULFIDINE) 500 mg tablet Take 500 mg by mouth daily.     tamsulosin (FLOMAX) 0.4 mg Cp24 Take 0.8 mg by mouth.     torsemide (DEMADEX) 20 MG tablet Take 20 mg by mouth 3 (three) times a day. 2 tabs in morning, 2 tabs in afternoon, 1 tab at night     WARFARIN SODIUM (WARFARIN ORAL) Take 4 mg by mouth daily.        Review of Systems:  History obtained from chart review and the patient  Respiratory ROS: no cough, shortness of breath, or wheezing  Cardiovascular ROS: no chest pain or dyspnea on exertion  Gastrointestinal ROS: no abdominal pain, change in bowel  habits, or black or bloody stools  Genito-Urinary ROS: no dysuria, trouble voiding, or hematuria  Musculoskeletal ROS: He no longer has a sharp back pain when he rises from the chair and is noticing marked improvements in strength with the more practice that he gets  Neurological ROS: no TIA or stroke symptoms  Dermatological ROS: he has vascular insufficiency peripherally with several small superficial erosions not rising to the level of gangrene         Laboratory:  Recent Labs      04/13/17   0551   INR  2.98*     Will continue regimen for Coumadin and recheck next week    Physical Examination:  /70  Pulse 64  Temp (!) 96.2  F (35.7  C)  Resp 18  Wt (!) 309 lb 8 oz (140.4 kg)  SpO2 95%  BMI 40.83 kg/m2  General appearance: alert, appears stated age, cooperative, flushed, no distress and copious tattoos  Neck: no adenopathy, no carotid bruit, no JVD, supple, symmetrical, trachea midline and thyroid not enlarged, symmetric, no tenderness/mass/nodules  Lungs: clear to auscultation bilaterally  Heart: regular rate and rhythm, S1, S2 normal, no murmur, click, rub or gallop  Abdomen: soft, non-tender; bowel sounds normal; no masses,  no organomegaly  Extremities: superficial erosions on both feet although pulses are felt  Pulses: 2+ and symmetric  Neurologic: Mental status: Alert, oriented, thought content appropriate  Motor:steadily increasing muscle strength with therapy, hopefully we have ignited the desire to maintain his function and he will continue to expand his repertoire at home       Impression:  David A Engelmann is a 85 y.o. male with Lumbar vertebral fracture apparently stabilized    1. Closed fracture of fourth lumbar vertebra with routine healing, unspecified fracture morphology, subsequent encounter     2. Chronic atrial fibrillation     3. Recurrent deep vein thrombosis (DVT)     4. Anticoagulated on Coumadin     5. Abdominal aortic aneurysm (AAA) without rupture     6. Essential  hypertension with goal blood pressure less than 140/90     7. Cardiac pacemaker in situ     8. Obesity due to excess calories, unspecified obesity severity     9. Chronic bilateral low back pain without sciatica     10. Rosacea     11. Ulcerative colitis with complication, unspecified location     12. Chronic kidney disease, stage III (moderate)     13. Chronic obstructive pulmonary disease, unspecified COPD type     14. Chronic diastolic congestive heart failure     15. Coronary artery disease involving native coronary artery of native heart without angina pectoris     16. BRITTNI on CPAP     17. Benign prostatic hyperplasia, presence of lower urinary tract symptoms unspecified, unspecified morphology         Plan: Continue as aggressive therapy as we can to prepare him for discharge next week    Electronically signed by: Romain Ramírez Sr., MD

## 2021-06-10 NOTE — PROGRESS NOTES
Code Status:  FULL CODE  Visit Type: Problem Visit     Facility:  [facility] [[id number]]         Facility Type: SNF (Skilled Nursing Facility, TCU)    History of Present Illness: David A Engelmann is a 85 y.o. male when seen today for follow-up from the TCU.  Patient recently admitted with an acute L4 lumbar fracture.  He is being treated with splinting.  He is morbidly obese.  Endurance fluctuates.  He is moving somewhat better than last week.  He is taking OxyContin 10 mg in the a.m. with Dilaudid 4 mg every 4 hours as needed for breakthrough pain.  I did schedule in a.m. dose prior to therapy.  He feels that this is helpful.He has a history of atrial fib with pacemaker.  He is on chronic anticoagulation.  History of COPD.  He does have some shortness of breath with exertion, further complicated by his congestive heart failure as well as large body habitus.  He has a wound on the bottom of his left foot.  He continues in therapy.      Active Ambulatory Problems     Diagnosis Date Noted     L4 vertebral fracture      Chronic kidney disease, stage III (moderate)      Recurrent deep vein thrombosis (DVT)      Atrial fibrillation      Abdominal aortic aneurysm      Anticoagulated on Coumadin      Diastolic congestive heart failure      Cardiac pacemaker in situ      Ulcerative colitis      CAD (coronary artery disease)      Chronic back pain      Hypertension, essential      BPH (benign prostatic hyperplasia)      COPD (chronic obstructive pulmonary disease)      Gout      Rosacea      Obesity      BRITTNI on CPAP      Abrasion of foot or toe 04/06/2017     Resolved Ambulatory Problems     Diagnosis Date Noted     No Resolved Ambulatory Problems     Past Medical History:   Diagnosis Date     Abdominal aortic aneurysm      TEZ (acute kidney injury)      Anticoagulated on Coumadin      Atrial fibrillation      BPH (benign prostatic hyperplasia)      CAD (coronary artery disease)      Cardiac  pacemaker in situ      Cervical spondylosis      Chronic back pain      Chronic kidney disease      Compression fracture of L1 lumbar vertebra      COPD (chronic obstructive pulmonary disease)      Diastolic congestive heart failure      Fracture of L1 vertebra      Fracture of T5 vertebra      Gout      Hyperlipidemia      Hypertension, essential      L4 vertebral fracture      Obesity      BRITTNI on CPAP      Osteoporosis      Recurrent deep vein thrombosis (DVT)      Rosacea      Ulcerative colitis        Current Outpatient Prescriptions   Medication Sig     allopurinol (ZYLOPRIM) 100 MG tablet Take 100 mg by mouth daily.     bisacodyl (DULCOLAX) 10 mg suppository Insert 10 mg into the rectum daily as needed.     calcium carbonate-vitamin D3 (CALTRATE 600 PLUS D3) 600 mg(1,500mg) -400 unit per tablet Take 1 tablet by mouth 2 (two) times a day.      dutasteride (AVODART) 0.5 mg capsule Take 0.5 mg by mouth daily.     fluocinonide (LIDEX) 0.05 % external solution Apply 1 application topically 2 (two) times a day. Apply to face and ears     FLUTICASONE/VILANTEROL (BREO ELLIPTA INHL) Inhale 1 puff daily.     HYDROmorphone (DILAUDID) 4 MG tablet Take 4 mg by mouth every 4 (four) hours as needed for pain. 30 min Before therapy     ipratropium-albuterol (DUO-NEB) 0.5-2.5 mg/3 mL nebulizer Inhale 3 mL 4 (four) times a day.      metoprolol succinate (TOPROL-XL) 25 MG Take 25 mg by mouth daily.     omeprazole (PRILOSEC) 20 MG capsule Take 20 mg by mouth Daily before breakfast.     oxyCODONE (OXYCONTIN) 10 mg 12 hr tablet Take 10 mg by mouth once daily.      polyethylene glycol (MIRALAX) 17 gram packet Take 17 g by mouth daily.      potassium chloride (KLOR-CON) 20 mEq packet Take 20 mEq by mouth daily.     senna-docusate (PERICOLACE) 8.6-50 mg tablet Take 3 tablets by mouth 2 (two) times a day.     sulfaSALAzine (AZULFIDINE) 500 mg tablet Take 500 mg by mouth daily.     tamsulosin (FLOMAX) 0.4 mg Cp24 Take 0.8 mg by mouth.      torsemide (DEMADEX) 20 MG tablet Take 20 mg by mouth 3 (three) times a day. 2 tabs in morning, 2 tabs in afternoon, 1 tab at night     WARFARIN SODIUM (WARFARIN ORAL) Take 4 mg by mouth daily.        Allergies   Allergen Reactions     Penicillins          Review of Systems   No fevers or chills. No headache, lightheadedness or dizziness. No SOB, chest pains or palpitations. Appetite is good. no nausea, vomiting, constipation or diarrhea. He is drinking prune juice.  No dysuria, frequency, burning or pain with urination.  Continued back pain however somewhat improved.    Physical Exam   PHYSICAL EXAMINATION:  Vital signs:   Vitals:    04/11/17 1219   BP: 122/70   Pulse: 64   Resp: 20   Temp: 97.4  F (36.3  C)   SpO2: 94%     General: Awake, Alert, oriented x3, appropriately, follows simple commands, conversant  HEENT:PERRLA, Pink conjunctiva, anicteric sclerae, moist oral mucosa, poor dentation.  NECK: Supple, without any lymphadenopathy, or masses  CVS:  S1  S2, without murmur or gallop.   LUNG: Decreased sounds in the bases.  No wheezes, rales or rhonci.  Large body habitus.  BACK: No kyphosis of the thoracic spine.  No focal inflammation around the lumbar spine.  Difficulty with rolling side to side in bed.  ABDOMEN: Soft, morbidly obese, nontender to palpation, with positive bowel sounds  EXTREMITIES: Good range of motion on both upper and lower extremities with generalized weakness, 1+ pedal edema, no calf tenderness  SKIN: Open area to bottom of left foot along the forefoot.  Minimal serous drainage.  Slight callus to the periwound nonraised.  Multiple tattoos.  Excoriation to the gluteal crease. Heels dry and cracked. Right great toe with scabbed lesion dorsal region with minimal serosanguinous drainage.   NEUROLOGIC: Intact, pulses palpable.  Some numbness and tingling to extremities.  PSYCHIATRIC: Cognition intact        Labs:    Recent Results (from the past 240 hour(s))   INR   Result Value Ref Range     INR 1.77 (H) 0.90 - 1.10   Basic Metabolic Panel   Result Value Ref Range    Sodium 142 136 - 145 mmol/L    Potassium 3.1 (L) 3.5 - 5.0 mmol/L    Chloride 104 98 - 107 mmol/L    CO2 28 22 - 31 mmol/L    Anion Gap, Calculation 10 5 - 18 mmol/L    Glucose 96 70 - 125 mg/dL    Calcium 8.9 8.5 - 10.5 mg/dL    BUN 16 8 - 28 mg/dL    Creatinine 1.17 0.70 - 1.30 mg/dL    GFR MDRD Af Amer >60 >60 mL/min/1.73m2    GFR MDRD Non Af Amer 59 (L) >60 mL/min/1.73m2   HM2(CBC w/o Differential)   Result Value Ref Range    WBC 7.8 4.0 - 11.0 thou/uL    RBC 3.65 (L) 4.40 - 6.20 mill/uL    Hemoglobin 12.7 (L) 14.0 - 18.0 g/dL    Hematocrit 37.6 (L) 40.0 - 54.0 %     (H) 80 - 100 fL    MCH 34.8 (H) 27.0 - 34.0 pg    MCHC 33.8 32.0 - 36.0 g/dL    RDW 13.3 11.0 - 14.5 %    Platelets 195 140 - 440 thou/uL    MPV 10.5 8.5 - 12.5 fL   INR   Result Value Ref Range    INR 1.94 (H) 0.90 - 1.10   Potassium   Result Value Ref Range    Potassium 3.1 (L) 3.5 - 5.0 mmol/L   INR   Result Value Ref Range    INR 3.36 (H) 0.90 - 1.10         Assessment/Plan:  1. Closed fracture of fourth lumbar vertebra with routine healing, unspecified fracture morphology, subsequent encounter     2. Thrombophlebitis     3. Chronic atrial fibrillation     4. Obesity due to excess calories, unspecified obesity severity     5. Anticoagulated on Coumadin     6. Chronic bilateral low back pain without sciatica     7. Chronic diastolic congestive heart failure     8. BRITTNI on CPAP     9. Foot ulcer     10. COPD (chronic obstructive pulmonary disease)     11. Abdominal aortic aneurysm (AAA) without rupture     12. Coronary artery disease involving native coronary artery of native heart without angina pectoris     13. Chronic kidney disease, stage III (moderate)     14. Recurrent deep vein thrombosis (DVT)     15. Essential hypertension with goal blood pressure less than 140/90     16. Cardiac pacemaker in situ     17. Ulcerative colitis with complication, unspecified  location     18. Benign prostatic hyperplasia, presence of lower urinary tract symptoms unspecified, unspecified morphology         L4 fracture being treated conservatively. Pain improving. Will attempt to wean off Oxycontin. Atrial fib on chronic anticoagulation.  INR is pending.  COPD.  CHF.  Compensated.  Mx foot wounds. New lesion to right great toe. I will order silvadene. Heels much improved. Chronic venous stasis changes to lower extremities.      Electronically signed by: Luna Jaquez, CNP